# Patient Record
Sex: FEMALE | Race: WHITE | Employment: FULL TIME | URBAN - METROPOLITAN AREA
[De-identification: names, ages, dates, MRNs, and addresses within clinical notes are randomized per-mention and may not be internally consistent; named-entity substitution may affect disease eponyms.]

---

## 2017-04-20 ENCOUNTER — OFFICE VISIT (OUTPATIENT)
Dept: FAMILY MEDICINE CLINIC | Age: 52
End: 2017-04-20

## 2017-04-20 VITALS
SYSTOLIC BLOOD PRESSURE: 130 MMHG | WEIGHT: 209.8 LBS | HEART RATE: 65 BPM | BODY MASS INDEX: 36.3 KG/M2 | OXYGEN SATURATION: 98 % | DIASTOLIC BLOOD PRESSURE: 76 MMHG | TEMPERATURE: 98.7 F

## 2017-04-20 DIAGNOSIS — B34.9 VIRAL SYNDROME: Primary | ICD-10-CM

## 2017-04-20 DIAGNOSIS — R05.9 COUGH: ICD-10-CM

## 2017-04-20 PROCEDURE — 99213 OFFICE O/P EST LOW 20 MIN: CPT | Performed by: FAMILY MEDICINE

## 2017-04-20 RX ORDER — BENZONATATE 200 MG/1
200 CAPSULE ORAL 3 TIMES DAILY PRN
Qty: 30 CAPSULE | Refills: 0 | Status: SHIPPED | OUTPATIENT
Start: 2017-04-20 | End: 2017-04-30

## 2017-04-20 RX ORDER — HYDROCODONE POLISTIREX AND CHLORPHENIRAMINE POLISTIREX 10; 8 MG/5ML; MG/5ML
5 SUSPENSION, EXTENDED RELEASE ORAL EVERY 12 HOURS PRN
Qty: 140 ML | Refills: 0 | Status: SHIPPED | OUTPATIENT
Start: 2017-04-20 | End: 2017-09-01 | Stop reason: ALTCHOICE

## 2017-05-23 ENCOUNTER — EMPLOYEE WELLNESS (OUTPATIENT)
Dept: OTHER | Age: 52
End: 2017-05-23

## 2017-05-23 LAB
CHOLESTEROL, TOTAL: 168 MG/DL (ref 0–199)
GLUCOSE BLD-MCNC: 87 MG/DL (ref 70–99)
HDLC SERPL-MCNC: 34 MG/DL (ref 40–60)
LDL CHOLESTEROL CALCULATED: 114 MG/DL
TRIGL SERPL-MCNC: 98 MG/DL (ref 0–150)

## 2017-08-11 ENCOUNTER — HOSPITAL ENCOUNTER (OUTPATIENT)
Dept: MAMMOGRAPHY | Age: 52
Discharge: HOME | End: 2017-08-11
Attending: OBSTETRICS & GYNECOLOGY | Admitting: OBSTETRICS & GYNECOLOGY

## 2017-08-11 DIAGNOSIS — Z12.31 ENCOUNTER FOR SCREENING MAMMOGRAM FOR BREAST CANCER: ICD-10-CM

## 2017-09-26 ENCOUNTER — HOSPITAL ENCOUNTER (OUTPATIENT)
Dept: ENDOSCOPY | Age: 52
Discharge: HOME | End: 2017-09-26
Attending: INTERNAL MEDICINE | Admitting: INTERNAL MEDICINE

## 2017-09-26 RX ORDER — SODIUM CHLORIDE 9 MG/ML
INJECTION, SOLUTION INTRAVENOUS CONTINUOUS
Status: CANCELLED | OUTPATIENT
Start: 2017-09-26

## 2017-09-26 RX ORDER — LIDOCAINE HYDROCHLORIDE 10 MG/ML
1 INJECTION, SOLUTION EPIDURAL; INFILTRATION; INTRACAUDAL; PERINEURAL
Status: CANCELLED | OUTPATIENT
Start: 2017-09-26 | End: 2017-09-26

## 2017-09-26 RX ORDER — ONDANSETRON 2 MG/ML
4 INJECTION INTRAMUSCULAR; INTRAVENOUS PRN
Status: CANCELLED | OUTPATIENT
Start: 2017-09-26

## 2017-09-26 RX ORDER — SODIUM CHLORIDE 0.9 % (FLUSH) 0.9 %
10 SYRINGE (ML) INJECTION EVERY 12 HOURS SCHEDULED
Status: DISCONTINUED | OUTPATIENT
Start: 2017-09-26 | End: 2017-09-27 | Stop reason: HOSPADM

## 2017-09-26 RX ORDER — SODIUM CHLORIDE 0.9 % (FLUSH) 0.9 %
10 SYRINGE (ML) INJECTION PRN
Status: CANCELLED | OUTPATIENT
Start: 2017-09-26

## 2017-09-26 RX ORDER — SODIUM CHLORIDE 0.9 % (FLUSH) 0.9 %
10 SYRINGE (ML) INJECTION PRN
Status: DISCONTINUED | OUTPATIENT
Start: 2017-09-26 | End: 2017-09-27 | Stop reason: HOSPADM

## 2017-09-26 RX ORDER — SODIUM CHLORIDE 0.9 % (FLUSH) 0.9 %
10 SYRINGE (ML) INJECTION EVERY 12 HOURS SCHEDULED
Status: CANCELLED | OUTPATIENT
Start: 2017-09-26

## 2017-09-26 RX ORDER — SODIUM CHLORIDE 9 MG/ML
INJECTION, SOLUTION INTRAVENOUS CONTINUOUS
Status: DISCONTINUED | OUTPATIENT
Start: 2017-09-26 | End: 2017-09-27 | Stop reason: HOSPADM

## 2017-09-26 ASSESSMENT — ENCOUNTER SYMPTOMS: SHORTNESS OF BREATH: 0

## 2018-02-14 ENCOUNTER — TELEPHONE (OUTPATIENT)
Dept: FAMILY MEDICINE CLINIC | Age: 53
End: 2018-02-14

## 2018-02-14 DIAGNOSIS — Z83.49 FAMILY HISTORY OF HYPERTHYROIDISM: Primary | ICD-10-CM

## 2018-02-14 DIAGNOSIS — Z00.00 WELL ADULT EXAM: ICD-10-CM

## 2018-02-16 ENCOUNTER — OFFICE VISIT (OUTPATIENT)
Dept: FAMILY MEDICINE CLINIC | Age: 53
End: 2018-02-16

## 2018-02-16 VITALS
TEMPERATURE: 98.7 F | HEART RATE: 73 BPM | SYSTOLIC BLOOD PRESSURE: 114 MMHG | WEIGHT: 207.38 LBS | RESPIRATION RATE: 16 BRPM | DIASTOLIC BLOOD PRESSURE: 80 MMHG | BODY MASS INDEX: 35.6 KG/M2 | OXYGEN SATURATION: 98 %

## 2018-02-16 DIAGNOSIS — K52.9 AGE (ACUTE GASTROENTERITIS): ICD-10-CM

## 2018-02-16 DIAGNOSIS — J06.9 VIRAL URI: Primary | ICD-10-CM

## 2018-02-16 PROCEDURE — 99213 OFFICE O/P EST LOW 20 MIN: CPT | Performed by: FAMILY MEDICINE

## 2018-02-16 NOTE — PATIENT INSTRUCTIONS
Tylenol. Read the labels to make sure that you are not taking more than the recommended dose. Too much acetaminophen (Tylenol) can be harmful. · Get plenty of rest.  · Do not smoke or allow others to smoke around you. If you need help quitting, talk to your doctor about stop-smoking programs and medicines. These can increase your chances of quitting for good. When should you call for help? Call 911 anytime you think you may need emergency care. For example, call if:  ? · You have severe trouble breathing. ?Call your doctor now or seek immediate medical care if:  ? · You seem to be getting much sicker. ? · You have new or worse trouble breathing. ? · You have a new or higher fever. ? · You have a new rash. ? Watch closely for changes in your health, and be sure to contact your doctor if:  ? · You have a new symptom, such as a sore throat, an earache, or sinus pain. ? · You cough more deeply or more often, especially if you notice more mucus or a change in the color of your mucus. ? · You do not get better as expected. Where can you learn more? Go to https://[email or web address].[email or web address]. org and sign in to your 20x200 account. Enter R227 in the Yvolver box to learn more about \"Upper Respiratory Infection (Cold): Care Instructions. \"     If you do not have an account, please click on the \"Sign Up Now\" link. Current as of: May 12, 2017  Content Version: 11.5  © 2504-3758 [employer] Care instructions adapted under license by [facility] ([facility]). If you have questions about a medical condition or this instruction, always ask your healthcare professional. [place] [address] any warranty or liability for your use of this information. What medications can I take if I have a heart condition or high blood pressure? Coricidin, Delsym, Robitussin DM and Mucinex. Avoid medications with the \"P\" words: phenylephrine and pseudoephedrine.     What over-the-counter

## 2018-02-16 NOTE — PROGRESS NOTES
Subjective:      Patient ID: Rosie Espinoza is a 47 y.o. female. Sinusitis   This is a new problem. The current episode started yesterday. The problem has been gradually worsening since onset. There has been no fever. Her pain is at a severity of 8/10. The pain is moderate. Treatments tried: ibuprofen  The treatment provided mild relief. Here with sinus pressure. Started yesterday but worse today. Lots of pressure behind eyes, ears, teeth and head. Took some ibuprofen - helped. No fever but hot flashes. Little RN and congestion but not much. Cough - dry. ST when dry. No appetite. Also started with watery diarrhea last night and today, no blood. No abd pain. No nausea or vomiting. Review of Systems     Allergies   Allergen Reactions    Penicillins Rash    Sulfa Antibiotics Rash       Objective:   Physical Exam  Allergies   Allergen Reactions    Penicillins Rash    Sulfa Antibiotics Rash       Vitals:    02/16/18 1147   BP: 114/80   Site: Right Arm   Position: Sitting   Cuff Size: Large Adult   Pulse: 73   Resp: 16   Temp: 98.7 °F (37.1 °C)   TempSrc: Tympanic   SpO2: 98%   Weight: 207 lb 6 oz (94.1 kg)     Wt Readings from Last 3 Encounters:   02/16/18 207 lb 6 oz (94.1 kg)   09/01/17 200 lb (90.7 kg)   04/20/17 209 lb 12.8 oz (95.2 kg)     Body mass index is 35.6 kg/m². Alert and oriented x 4 NAD, obese, well hydrated, well developed. Left TM blocked by wax,  and pinna nl  Right TM nl, canal nl and pinna nl  No nodes neck  Nares red and congested, clear drainage  OP mild erythema, no exudate, no swelling  Lung clear with good air movement and effort  CV rrr        Assessment:      Hari Ortega was seen today for sinusitis. Diagnoses and all orders for this visit:    Viral URI    AGE (acute gastroenteritis)    Likely viral infection  Continue symptomatic treatment. Call or return to office if symptoms are worsening or not starting to improve after 7-10 days.

## 2018-02-21 ENCOUNTER — HOSPITAL ENCOUNTER (OUTPATIENT)
Dept: OTHER | Age: 53
Discharge: HOME | End: 2018-02-21
Attending: FAMILY MEDICINE | Admitting: FAMILY MEDICINE

## 2018-02-21 DIAGNOSIS — Z83.49 FAMILY HISTORY OF HYPERTHYROIDISM: ICD-10-CM

## 2018-02-21 DIAGNOSIS — Z00.00 WELL ADULT EXAM: ICD-10-CM

## 2018-02-21 LAB
A/G RATIO: 1.6 (ref 1.1–2.2)
ALBUMIN SERPL-MCNC: 4.6 G/DL (ref 3.4–5)
ALP BLD-CCNC: 73 U/L (ref 40–129)
ALT SERPL-CCNC: 20 U/L (ref 10–40)
ANION GAP SERPL CALCULATED.3IONS-SCNC: 11 MMOL/L (ref 3–16)
AST SERPL-CCNC: 20 U/L (ref 15–37)
BILIRUB SERPL-MCNC: 0.3 MG/DL (ref 0–1)
BUN BLDV-MCNC: 19 MG/DL (ref 7–20)
CALCIUM SERPL-MCNC: 9.4 MG/DL (ref 8.3–10.6)
CHLORIDE BLD-SCNC: 104 MMOL/L (ref 99–110)
CO2: 28 MMOL/L (ref 21–32)
CREAT SERPL-MCNC: 0.6 MG/DL (ref 0.6–1.1)
GFR AFRICAN AMERICAN: >60
GFR NON-AFRICAN AMERICAN: >60
GLOBULIN: 2.8 G/DL
GLUCOSE BLD-MCNC: 96 MG/DL (ref 70–99)
HCT VFR BLD CALC: 38.8 % (ref 36–48)
HEMOGLOBIN: 13.3 G/DL (ref 12–16)
MCH RBC QN AUTO: 28.8 PG (ref 26–34)
MCHC RBC AUTO-ENTMCNC: 34.3 G/DL (ref 31–36)
MCV RBC AUTO: 84 FL (ref 80–100)
PDW BLD-RTO: 13.7 % (ref 12.4–15.4)
PLATELET # BLD: 189 K/UL (ref 135–450)
PMV BLD AUTO: 8.5 FL (ref 5–10.5)
POTASSIUM SERPL-SCNC: 4.1 MMOL/L (ref 3.5–5.1)
RBC # BLD: 4.62 M/UL (ref 4–5.2)
SODIUM BLD-SCNC: 143 MMOL/L (ref 136–145)
T4 FREE: 1.1 NG/DL (ref 0.9–1.8)
TOTAL PROTEIN: 7.4 G/DL (ref 6.4–8.2)
TSH SERPL DL<=0.05 MIU/L-ACNC: 1.55 UIU/ML (ref 0.27–4.2)
WBC # BLD: 3.6 K/UL (ref 4–11)

## 2018-03-02 ENCOUNTER — OFFICE VISIT (OUTPATIENT)
Dept: FAMILY MEDICINE CLINIC | Age: 53
End: 2018-03-02

## 2018-03-02 VITALS
SYSTOLIC BLOOD PRESSURE: 110 MMHG | BODY MASS INDEX: 35.57 KG/M2 | DIASTOLIC BLOOD PRESSURE: 80 MMHG | OXYGEN SATURATION: 97 % | HEART RATE: 56 BPM | WEIGHT: 207.2 LBS

## 2018-03-02 DIAGNOSIS — R42 VERTIGO: ICD-10-CM

## 2018-03-02 DIAGNOSIS — R06.09 DOE (DYSPNEA ON EXERTION): Primary | ICD-10-CM

## 2018-03-02 DIAGNOSIS — I49.9 IRREGULAR HEART BEAT: ICD-10-CM

## 2018-03-02 PROCEDURE — 93000 ELECTROCARDIOGRAM COMPLETE: CPT | Performed by: FAMILY MEDICINE

## 2018-03-02 PROCEDURE — 99214 OFFICE O/P EST MOD 30 MIN: CPT | Performed by: FAMILY MEDICINE

## 2018-03-06 ENCOUNTER — HOSPITAL ENCOUNTER (OUTPATIENT)
Dept: OTHER | Age: 53
Discharge: HOME | End: 2018-03-06
Attending: FAMILY MEDICINE | Admitting: FAMILY MEDICINE

## 2018-03-06 DIAGNOSIS — R06.09 OTHER FORM OF DYSPNEA: ICD-10-CM

## 2018-03-14 ENCOUNTER — HOSPITAL ENCOUNTER (OUTPATIENT)
Dept: NON INVASIVE DIAGNOSTICS | Age: 53
Discharge: HOME | End: 2018-03-14
Attending: FAMILY MEDICINE | Admitting: FAMILY MEDICINE

## 2018-03-14 DIAGNOSIS — R06.09 OTHER FORMS OF DYSPNEA: ICD-10-CM

## 2018-03-14 LAB
LV EF: 62 %
LVEF MODALITY: NORMAL

## 2018-03-20 VITALS — WEIGHT: 205 LBS | BODY MASS INDEX: 35.46 KG/M2

## 2018-04-14 ENCOUNTER — EMPLOYEE WELLNESS (OUTPATIENT)
Dept: OTHER | Age: 53
End: 2018-04-14

## 2018-04-14 LAB
CHOLESTEROL, TOTAL: 174 MG/DL (ref 0–199)
GLUCOSE BLD-MCNC: 94 MG/DL (ref 70–99)
HDLC SERPL-MCNC: 35 MG/DL (ref 40–60)
LDL CHOLESTEROL CALCULATED: 122 MG/DL
TRIGL SERPL-MCNC: 83 MG/DL (ref 0–150)

## 2018-04-23 VITALS — WEIGHT: 204 LBS | BODY MASS INDEX: 35.02 KG/M2

## 2018-05-22 ENCOUNTER — TELEPHONE (OUTPATIENT)
Dept: FAMILY MEDICINE CLINIC | Age: 53
End: 2018-05-22

## 2018-05-25 ENCOUNTER — OFFICE VISIT (OUTPATIENT)
Dept: FAMILY MEDICINE CLINIC | Age: 53
End: 2018-05-25

## 2018-05-25 VITALS
HEART RATE: 72 BPM | DIASTOLIC BLOOD PRESSURE: 80 MMHG | WEIGHT: 204.38 LBS | RESPIRATION RATE: 12 BRPM | SYSTOLIC BLOOD PRESSURE: 112 MMHG | BODY MASS INDEX: 35.08 KG/M2

## 2018-05-25 DIAGNOSIS — M54.12 CERVICAL RADICULOPATHY: Primary | ICD-10-CM

## 2018-05-25 PROCEDURE — 99214 OFFICE O/P EST MOD 30 MIN: CPT | Performed by: FAMILY MEDICINE

## 2018-05-25 RX ORDER — PREDNISONE 20 MG/1
TABLET ORAL
Qty: 20 TABLET | Refills: 0 | Status: SHIPPED | OUTPATIENT
Start: 2018-05-25 | End: 2018-07-06

## 2018-05-31 ENCOUNTER — TELEPHONE (OUTPATIENT)
Dept: FAMILY MEDICINE CLINIC | Age: 53
End: 2018-05-31

## 2018-06-01 ENCOUNTER — TELEPHONE (OUTPATIENT)
Dept: FAMILY MEDICINE CLINIC | Age: 53
End: 2018-06-01

## 2018-06-01 DIAGNOSIS — E04.1 THYROID NODULE: Primary | ICD-10-CM

## 2018-06-08 ENCOUNTER — HOSPITAL ENCOUNTER (OUTPATIENT)
Dept: ULTRASOUND IMAGING | Age: 53
Discharge: HOME | End: 2018-06-08
Attending: FAMILY MEDICINE | Admitting: FAMILY MEDICINE

## 2018-06-08 DIAGNOSIS — E04.1 NONTOXIC SINGLE THYROID NODULE: ICD-10-CM

## 2018-06-08 DIAGNOSIS — E04.1 THYROID NODULE: ICD-10-CM

## 2018-06-12 ENCOUNTER — TELEPHONE (OUTPATIENT)
Dept: FAMILY MEDICINE CLINIC | Age: 53
End: 2018-06-12

## 2018-07-06 ENCOUNTER — OFFICE VISIT (OUTPATIENT)
Dept: ORTHOPEDIC SURGERY | Age: 53
End: 2018-07-06

## 2018-07-06 VITALS
HEART RATE: 56 BPM | BODY MASS INDEX: 33.99 KG/M2 | HEIGHT: 65 IN | DIASTOLIC BLOOD PRESSURE: 78 MMHG | WEIGHT: 204 LBS | SYSTOLIC BLOOD PRESSURE: 155 MMHG

## 2018-07-06 DIAGNOSIS — M54.12 CERVICAL RADICULOPATHY: ICD-10-CM

## 2018-07-06 DIAGNOSIS — M48.02 FORAMINAL STENOSIS OF CERVICAL REGION: ICD-10-CM

## 2018-07-06 DIAGNOSIS — M50.20 HNP (HERNIATED NUCLEUS PULPOSUS), CERVICAL: Primary | ICD-10-CM

## 2018-07-06 PROCEDURE — 99243 OFF/OP CNSLTJ NEW/EST LOW 30: CPT | Performed by: PHYSICAL MEDICINE & REHABILITATION

## 2018-07-06 RX ORDER — IBUPROFEN 200 MG
200 TABLET ORAL EVERY 6 HOURS PRN
COMMUNITY

## 2018-07-06 NOTE — PROGRESS NOTES
New Patient: SPINE    Referring Provider:  Endy Terry MD    CHIEF COMPLAINT:    Chief Complaint   Patient presents with    Neck Pain     NP CSP; Patient complains of CSP for a couple months with no injury. She states she has had episodes in the past but they have gone away but this one is lasting longer. She gets radiating pain down to right elbow. She has been using ice and Ibuprofen for pain.  Arm Pain     Right radiating arm pain, no numbness or tingling       HISTORY OF PRESENT ILLNESS:      · The patient is being sent at the request of Endy Terry MD in consultation as a new spine patient for neck pain and right arm pain. The patient is a 46 y.o. female whom reports she developed neck and right arm pain intermittently for the past few years. This worsened about 2 months ago. She reports having pain radiating to the right elbow. She feels weak in her right arm. She denies having numbness or tingling. 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  She has seen Dr. Rodney Guzman in the past who recommended surgery for her. She states she does not want to proceed with a surgical interventions. She is not had any physical therapy or any injections performed. She last had an MRI of the cervical spine on 6/1/2018    Pain Assessment  Location of Pain: Arm  Location Modifiers: Right  Severity of Pain: 6  Quality of Pain: Throbbing, Aching  Duration of Pain: Persistent  Frequency of Pain: Constant  Aggravating Factors:  Other (Comment) (lifting)  Limiting Behavior: Yes  Relieving Factors: Rest, Ice, Nsaids  Result of Injury: No  Work-Related Injury: No  Are there other pain locations you wish to document?: No      Associated signs and symptoms:   Neurogenic bowel or bladder symptoms:  no   Perceived weakness:  yes   Difficulty walking:  no    Recent Imaging (within past one year)   Xrays: no   MRI or CT of spine: yes    Current/Past Treatment:   · Physical Therapy:  none  · Chiropractic: brain    Seizures Sister     Kidney Disease Sister     Diabetes Sister     Kidney Disease Brother     Diabetes Brother     Stroke Maternal Grandfather          REVIEW OF SYSTEMS: Full ROS noted & scanned          PHYSICAL EXAM:    Vitals: Blood pressure (!) 155/78, pulse 56, height 5' 5\" (1.651 m), weight 204 lb (92.5 kg). GENERAL EXAM:  · General Apparence: Patient is adequately groomed with no evidence of malnutrition. · Psychiatric: Orientation: The patient is oriented to time, place and person. The patient's mood and affect are appropriate   · Vascular: Examination reveals no swelling and palpation reveals no tenderness in upper or lower extremities. Good capillary refill. · The lymphatic examination of the neck, axillae and groin reveals all areas to be without enlargement or induration   Sensation is intact without deficit in the upper and lower extremities to light touch and pinprick  · Coordination of the upper and lower extremities are normal.    CERVICAL EXAMINATION:  · Inspection: Local inspection shows no step-off or bruising. Cervical alignment is normal. No instability is noted. · Palpation and Percussion: No evidence of tenderness at the midline. Paraspinal tenderness is not present. There is no paraspinal spasm. · Range of Motion:  limited by 50% in all planes due to pain   · Strength: 5/5 bilateral upper extremities except 4/5 in the right proximal UE  · Special Tests:   Spurling's + on right and Adam's are negative bilaterally. Newton and Impingement tests are negative bilaterally. · Skin:There are no rashes, ulcerations or lesions. · Reflexes: Bilaterally triceps, biceps and brachioradialis are 1+. Clonus absent bilaterally at the feet. No pathological reflexes are noted.   · Gait & station: normal, patient ambulates without assistance  · Additional Examinations:  · RIGHT UPPER EXTREMITY:  Inspection/examination of the right upper extremity does not show any tenderness, with HEP. 3. Further studies: None at this time  4. Interventional:  We discussed pursuing a C6/7 IL (R) epidural steroid injection to address the pain. Radiologic imaging and symptoms confirm the pain etiology. Risks, benefits and alternatives of interventional options were discussed. These include and are not limited to bleeding, infection, spinal headache, nerve injury, increased pain and lack of pain relief. The patient verbalized understanding and would like to proceed. The patient will be scheduled accordingly. 5. Healthy Lifestyle Measures:  Patient education material - Anatomic drawings, healthy lifestyle education, osteoporosis prevention, back and neck pain educational information, and advanced imaging preparedness were distributed to the patient. Posture education, proper lifting and carrying techniques, weight control, quitting smoking and minor ways to treat back pain were reviewed and distributed. For further information regarding the spine conditions and to review interventional treatments the patient was directed to [facility].  6.  Follow up:  4-6 weeks        Corinna Ramirez MD, TARSHA, [facility]  Board Certified in [address] [place] [address]  Certified and Fellowship Trained in [facility] ([facility])     This dictation was performed with a verbal recognition program [facility]) and it was checked for errors. It is possible that there are still dictated errors within this office note. If so, please bring any errors to my attention for an addendum. All efforts were made to ensure that this office note is accurate.

## 2018-07-09 ENCOUNTER — TELEPHONE (OUTPATIENT)
Dept: ORTHOPEDIC SURGERY | Age: 53
End: 2018-07-09

## 2018-07-20 ENCOUNTER — HOSPITAL ENCOUNTER (OUTPATIENT)
Dept: PAIN MANAGEMENT | Age: 53
Discharge: HOME | End: 2018-07-20
Attending: PHYSICAL MEDICINE & REHABILITATION | Admitting: PHYSICAL MEDICINE & REHABILITATION

## 2018-07-20 VITALS
HEIGHT: 64 IN | WEIGHT: 195 LBS | RESPIRATION RATE: 18 BRPM | BODY MASS INDEX: 33.29 KG/M2 | SYSTOLIC BLOOD PRESSURE: 143 MMHG | OXYGEN SATURATION: 96 % | HEART RATE: 60 BPM | TEMPERATURE: 98.8 F | DIASTOLIC BLOOD PRESSURE: 77 MMHG

## 2018-07-20 LAB
INR BLD: 1.04 (ref 0.86–1.14)
PROTHROMBIN TIME: 11.9 SEC (ref 9.8–13)

## 2018-07-20 ASSESSMENT — PAIN DESCRIPTION - DESCRIPTORS: DESCRIPTORS: ACHING

## 2018-07-20 ASSESSMENT — PAIN - FUNCTIONAL ASSESSMENT: PAIN_FUNCTIONAL_ASSESSMENT: 0-10

## 2018-07-20 ASSESSMENT — PAIN SCALES - GENERAL: PAINLEVEL_OUTOF10: 0

## 2018-08-06 ENCOUNTER — OFFICE VISIT (OUTPATIENT)
Dept: ORTHOPEDIC SURGERY | Age: 53
End: 2018-08-06

## 2018-08-06 VITALS
HEIGHT: 65 IN | HEART RATE: 64 BPM | BODY MASS INDEX: 33.99 KG/M2 | SYSTOLIC BLOOD PRESSURE: 130 MMHG | WEIGHT: 204 LBS | DIASTOLIC BLOOD PRESSURE: 76 MMHG

## 2018-08-06 DIAGNOSIS — M54.12 CERVICAL RADICULOPATHY: ICD-10-CM

## 2018-08-06 DIAGNOSIS — M50.20 HNP (HERNIATED NUCLEUS PULPOSUS), CERVICAL: Primary | ICD-10-CM

## 2018-08-06 PROCEDURE — 99213 OFFICE O/P EST LOW 20 MIN: CPT | Performed by: PHYSICAL MEDICINE & REHABILITATION

## 2018-08-06 NOTE — PROGRESS NOTES
Follow up: SPINE      CHIEF COMPLAINT:    Chief Complaint   Patient presents with    Neck Pain     F/u C6-C7 IL 7/20. Patient notes moderate improvement with injection. States pain is no longer constant and job duties are easier to perform. HISTORY OF PRESENT ILLNESS:     The patient is a 46 y.o. femalewhom reports she presents after undergoing a C6 7 interlaminar approach epidural steroid injection. She said 50% pain relief. 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  She continues to have some pain along the right biceps. Functionally she still feels weaker in right arm compared to her left arm. Pain Assessment  Location of Pain: Neck  Location Modifiers: Posterior  Severity of Pain: 2  Quality of Pain: Dull  Duration of Pain: A few hours  Frequency of Pain: Intermittent  Aggravating Factors: Other (Comment) (N/A)  Limiting Behavior: Some  Relieving Factors: Rest  Result of Injury: No  Work-Related Injury: No  Are there other pain locations you wish to document?: No    Associated signs and symptoms:   Neurogenic bowel or bladder symptoms:  no   Perceived weakness:  yes   Difficulty walking:  no              Past Medical History:   Past Medical History:   Diagnosis Date    Anxiety     Endometriosis 1987    laporoscopy: endometriosis 1987    Migraine, unspecified, without mention of intractable migraine without mention of status migrainosus       Past Surgical History:     Past Surgical History:   Procedure Laterality Date    CARPAL TUNNEL RELEASE Right     ENDOMETRIAL ABLATION      LAPAROSCOPY      for endometriosis     Current Medications:     Current Outpatient Prescriptions:     ibuprofen (ADVIL;MOTRIN) 200 MG tablet, Take 200 mg by mouth every 6 hours as needed for Pain, Disp: , Rfl:     oxybutynin (DITROPAN) 5 MG tablet, Take 5 mg by mouth daily , Disp: , Rfl:     citalopram (CELEXA) 40 MG tablet, Take 40 mg by mouth daily. , Disp: , Rfl:   Allergies:  Penicillins and Sulfa antibiotics  Social History:    reports that she has never smoked. She has never used smokeless tobacco. She reports that she drinks alcohol. She reports that she does not use drugs. Family History:   Family History   Problem Relation Age of Onset    Ulcerative Colitis Mother     Hypertension Mother     Asthma Mother     Cancer Mother         lung    Kidney Disease Mother     Heart Disease Father         68 yo    Diabetes Father     Asthma Sister     Cancer Sister         brain    Seizures Sister     Kidney Disease Sister     Diabetes Sister     Kidney Disease Brother     Diabetes Brother     Stroke Maternal Grandfather        REVIEW OF SYSTEMS:   CONSTITUTIONAL: Denies unexplained weight loss, fevers, chills or fatigue  NEUROLOGICAL: Denies unsteady gait or progressive weakness  MUSCULOSKELETAL: Denies joint swelling or redness  GI: Denies nausea, vomiting, diarrhea   : Denies bowel or bladder issues       PHYSICAL EXAM:    Vitals: Blood pressure 130/76, pulse 64, height 5' 5\" (1.651 m), weight 204 lb (92.5 kg). GENERAL EXAM:  · General Apparence: Patient is adequately groomed with no evidence of malnutrition. · Psychiatric: Orientation: The patient is oriented to time, place and person. The patient's mood and affect are appropriate   · Vascular: Examination reveals no swelling and palpation reveals no tenderness in upper or lower extremities. Good capillary refill. · The lymphatic examination of the neck, axillae and groin reveals all areas to be without enlargement or induration  · Sensation is intact without deficit in the upper and lower extremities to light touch and pinprick  · Coordination of the upper and lower extremities are normal.    CERVICAL EXAMINATION:  · Inspection: Local inspection shows no step-off or bruising. Cervical alignment is normal. No instability is noted.   · Palpation and Percussion: No evidence of tenderness at the midline. Paraspinal tenderness is not present. There is no paraspinal spasm. Skin:There are no rashes, ulcerations or lesions  · Range of Motion:  Mildly increased pain with rotational bending to the right   · Strength: 5/5 bilateral upper extremities  · Special Tests:   Spurling's and Adam's are negative bilaterally. · Skin:There are no rashes, ulcerations or lesions in right & left upper extremities. · Reflexes: Bilaterally triceps, biceps and brachioradialis are 1+. Clonus absent bilaterally at the feet. No pathological reflexes are noted. · Gait & station: normal, patient ambulates without assistance  · Additional Examinations:  · RIGHT UPPER EXTREMITY:  Inspection/examination of the right upper extremity does not show any tenderness, deformity or injury. Range of motion is unremarkable and pain-free. There is no gross instability. There are no rashes, ulcerations or lesions. Strength and tone are normal. No atrophy or abnormal movements are noted. · LEFT UPPER EXTREMITY: Inspection/examination of the left upper extremity does not show any tenderness, deformity or injury. Range of motion is unremarkable and pain-free. There is no gross instability. There are no rashes, ulcerations or lesions. Strength and tone are normal. No atrophy or abnormal movements are noted. Diagnostic Testing:    MR cervical spine shows 6/1/18  Disc and osteophytes result in stenosis of the thecal sac and narrowing of   the neural foramina particularly at C5-6 and C6-7 as discussed above. Results for orders placed or performed during the hospital encounter of 07/20/18   Protime-INR   Result Value Ref Range    Protime 11.9 9.8 - 13.0 sec    INR 1.04 0.86 - 1.14     Impression:       1. HNP (herniated nucleus pulposus), cervical    2. Cervical radiculopathy        Plan:  Clinical Course: are improving    1.  Medications:  Continue anti-inflammatories with appropriate GI Precautions including to stop if develop dark tarry stools or GI upset and to take with food. 2. PT:  I will start the patient on a trial of PT to work on a cervical stabilization program to focus on stretching, strengthening, traction and modalities as indicated. 3. Further studies:  No further studies. 4. Interventional:  Consider second MARYA  5. Follow up:  4-6 weeks          Corinna Sanchez MD, TARSHA, [facility]  Board Certified in [address] [place] [address] Certified and Fellowship Trained in [facility] ([facility])             This dictation was performed with a verbal recognition program [facility]) and it was checked for errors. It is possible that there are still dictated errors within this office note. If so, please bring any errors to my attention for an addendum. All efforts were made to ensure that this office note is accurate.

## 2018-08-16 ENCOUNTER — HOSPITAL ENCOUNTER (OUTPATIENT)
Dept: PHYSICAL THERAPY | Age: 53
Discharge: HOME | End: 2018-08-31
Admitting: PHYSICAL MEDICINE & REHABILITATION

## 2018-08-16 NOTE — FLOWSHEET NOTE
Manual Intervention       Shld /GH Mobs       Post Cap mobs  4'     Thoracic/Rib manipualtion       CT MT/Mobs       PROM MT       Cervical mobs   10'  R C5, C7 Grade III-IV          NMR re-education       T-spine Ext       GH depres/compress       Delicia Scap Bio       Scap/GH NMR       Body blade       Wall ball roll       Wall Ball bounce                  Therapeutic Exercise and NMR EXR  [] (54539) Provided verbal/tactile cueing for activities related to strengthening, flexibility, endurance, ROM  for improvements in scapular, scapulothoracic and UE control with self care, reaching, carrying, lifting, house/yardwork, driving/computer work.    [] (37008) Provided verbal/tactile cueing for activities related to improving balance, coordination, kinesthetic sense, posture, motor skill, proprioception  to assist with  scapular, scapulothoracic and UE control with self care, reaching, carrying, lifting, house/yardwork, driving/computer work. Therapeutic Activities:    [] (56598 or 57303) Provided verbal/tactile cueing for activities related to improving balance, coordination, kinesthetic sense, posture, motor skill, proprioception and motor activation to allow for proper function of scapular, scapulothoracic and UE control with self care, carrying, lifting, driving/computer work.      Home Exercise Program:    [x] (21433) Reviewed/Progressed HEP activities related to strengthening, flexibility, endurance, ROM of scapular, scapulothoracic and UE control with self care, reaching, carrying, lifting, house/yardwork, driving/computer work  [] (85126) Reviewed/Progressed HEP activities related to improving balance, coordination, kinesthetic sense, posture, motor skill, proprioception of scapular, scapulothoracic and UE control with self care, reaching, carrying, lifting, house/yardwork, driving/computer work      Manual Treatments:  PROM / STM / Oscillations-Mobs:  G-I, II, III, IV (PA's,

## 2018-08-16 NOTE — PLAN OF CARE
[address], [facility]  [facility] [address]  Phone [phone]   Fax [phone]                                                       Physical Therapy Certification    Dear Referring Practitioner: Dr. Vania Buerger,    We had the pleasure of evaluating the following patient for physical therapy services at [address] [place] [address]. A summary of our findings can be found in the initial assessment below. This includes our plan of care. If you have any questions or concerns regarding these findings, please do not hesitate to contact me at the office phone number checked above. Thank you for the referral.       Physician Signature:_______________________________Date:__________________  By signing above (or electronic signature), therapists plan is approved by physician      Patient: Vicky Jordan \"Juanita\" Sidney Suarez   : 1965   MRN: [id number]  Referring Physician: Referring Practitioner: Dr. Vania Buerger      Evaluation Date: 2018      Medical Diagnosis Information:  Diagnosis: Cervical radiculopahty M54.12   Treatment Diagnosis: Cervical Radiculopathy with R UE pain                                         Insurance information: PT Insurance Information: [employer] [place] - $1000 deductible, $0 copay, 90/10% coinsurance, PT limits based on medical necessity    Precautions/ Contra-indications: None  Latex Allergy:  [x]NO      []YES  Preferred Language for Healthcare:   [x]English       []other:    SUBJECTIVE: Patient with pain in the R shoulder and arm. Had JOE which helped the pain, but now, the pain has started to return. Was on prednisone for about 4 days, but developed stomach issues. Patient is R handed.  To f/u with Dr. Jennifer Veloz in about 6 weeks    Relevant Medical History:Had similar pain years ago and went away on its own  Functional Disability Index: PT G-Codes  Functional Assessment Tool Used: Neck Disability Index  Score: 20%  Functional Limitation: Changing and maintaining body position  Changing and Maintaining Body Position Current Status (): At least 20 percent but less than 40 percent impaired, limited or restricted  Changing and Maintaining Body Position Goal Status (): At least 1 percent but less than 20 percent impaired, limited or restricted    Pain Scale: 5/10  Easing factors: ice, ibuprofen  Provocative factors: increased force through the R UE, worse at night     Type: []Constant   [x]Intermittent  []Radiating []Localized []other:     Numbness/Tingling: Denies numbness / tingling in the UE    Occupation/School: [employer] at [address] Level of Function: Independent with ADLs and IADLs, active in watching [employer]    OBJECTIVE:     CERV ROM     Cervical Flexion 0-55    Cervical Extension 0-40    Cervical SB 0-35 tightness on R 0-45   Cervical rotation 0-70 0-70        ROM Left Right   Shoulder Flex WNL WNL   Shoulder Abd WNL WNL   Shoulder ER WNL WNL   Shoulder IR WNL WNL             Strength (measured in lbs with hand held dynamometer) Left Right   Shoulder Flex 21.3 12.5   Shoulder ER 18.4 14.6   Shoulder IR 19.2 16.4   Shoulder Abd 26.5 23.7     Reflexes/Sensation:    [x]Dermatomes/Myotomes intact    [x]Reflexes equal and normal bilaterally   []Other:    Joint mobility: R C4, C5   []Normal    [x]Hypo   []Hyper    Palpation: Tightness along R upper trapezius    Functional Mobility/Transfers: unremarkable    Posture: Forward head and rounded shoulders    Bandages/Dressings/Incisions: n/a    Gait: (include devices/WB status): WNL     Orthopedic Special Tests: Shoulder impingement +, Cervical compression -, Cervical distraction -, ULTT +                       [x] Patient history, allergies, meds reviewed. Medical chart reviewed. See intake form.      Review Of Systems (ROS):  [x]Performed Review of systems (Integumentary, CardioPulmonary, Neurological) by intake and observation. Intake form has been scanned into medical record. Patient has been instructed to contact their primary care physician regarding ROS issues if not already being addressed at this time. Co-morbidities/Complexities (which will affect course of rehabilitation):   []None           Arthritic conditions   []Rheumatoid arthritis (M05.9)  []Osteoarthritis (M19.91)   Cardiovascular conditions   []Hypertension (I10)  []Hyperlipidemia (E78.5)  []Angina pectoris (I20)  []Atherosclerosis (I70)   Musculoskeletal conditions   []Disc pathology   []Congenital spine pathologies   []Prior surgical intervention  []Osteoporosis (M81.8)  []Osteopenia (M85.8)   Endocrine conditions   []Hypothyroid (E03.9)  []Hyperthyroid Gastrointestinal conditions   []Constipation (Y21.97)   Metabolic conditions   []Morbid obesity (E66.01)  []Diabetes type 1(E10.65) or 2 (E11.65)   []Neuropathy (G60.9)     Pulmonary conditions   []Asthma (J45)  []Coughing   []COPD (J44.9)   Psychological Disorders  [x]Anxiety (F41.9) / Depression (F32.9)   []Other:   []Other:          Barriers to/and or personal factors that will affect rehab potential:              []Age  []Sex              []Motivation/Lack of Motivation                        []Co-Morbidities              []Cognitive Function, education/learning barriers              []Environmental, home barriers              []profession/work barriers  []past PT/medical experience  []other:  Justification:     Falls Risk Assessment (30 days):   [x] Falls Risk assessed and no intervention required. [] Falls Risk assessed and Patient requires intervention due to being higher risk   TUG score (>12s at risk):     [] Falls education provided, including       G-Codes:  PT G-Codes  Functional Assessment Tool Used: Neck Disability Index  Score: 20%  Functional Limitation: Changing and maintaining body position  Changing and Maintaining Body Position Current Status ():  At least 20 percent but less than neck pain with headaches (cervicogenic)    []Signs/symptoms consistent with nerve root involvement including myotome & dermatome dysfunction   []sign/symptoms consistent with facet dysfunction of cervical and thoracic spine    []signs/symptoms consistent suggesting central cord compression/UMN syndromes   []signs/symptoms consistent with discogenic cervical pain   []signs/symptoms consistent with rib dysfunction   [x]signs/symptoms consistent with postural dysfunction   []signs/symptoms consistent with shoulder pathology    []signs/symptoms consistent with post-surgical status including decreased ROM, strength and function.    []signs/symptoms consistent with pathology which may benefit from Dry Needling   []signs/symptoms which may limit the use of advanced manual therapy techniques: (Hypertension, recent trauma, intolerance to end range positions, prior TIA, visual issues, UE myotomes loss )     Prognosis/Rehab Potential:      [x]Excellent   []Good    []Fair   []Poor    Tolerance of evaluation/treatment:    [x]Excellent   []Good    []Fair   []Poor    Physical Therapy Evaluation Complexity Justification  [x] A history of present problem with:  [] no personal factors and/or comorbidities that impact the plan of care;  [x]1-2 personal factors and/or comorbidities that impact the plan of care  []3 personal factors and/or comorbidities that impact the plan of care  [x] An examination of body systems using standardized tests and measures addressing any of the following: body structures and functions (impairments), activity limitations, and/or participation restrictions;:  [] a total of 1-2 or more elements   [x] a total of 3 or more elements   [] a total of 4 or more elements   [x] A clinical presentation with:  [x] stable and/or uncomplicated characteristics   [] evolving clinical presentation with changing characteristics  [] unstable and unpredictable characteristics;   [x] Clinical decision making of [x] low, [] awareness and control and activation of  Deep cervical stabilizers to allow for proper functional mobility as indicated by patients Functional Deficits. 4. Patient will return to lifting and reaching functional activities without increased symptoms or restriction.    5. Patient will report at least 75% reduction in frequency and intensity of numbness / tingling in the R UE      Electronically signed by:  Maria L Melvin PT

## 2018-08-21 ENCOUNTER — HOSPITAL ENCOUNTER (OUTPATIENT)
Dept: PHYSICAL THERAPY | Age: 53
Discharge: HOME | End: 2018-08-22
Admitting: PHYSICAL MEDICINE & REHABILITATION

## 2018-08-21 NOTE — FLOWSHEET NOTE
depres/compress       Delicia Scap Bio       Scap/GH NMR       Body blade       Wall ball roll       Wall Ball bounce                  Therapeutic Exercise and NMR EXR  [] (27651) Provided verbal/tactile cueing for activities related to strengthening, flexibility, endurance, ROM  for improvements in scapular, scapulothoracic and UE control with self care, reaching, carrying, lifting, house/yardwork, driving/computer work.    [] (88501) Provided verbal/tactile cueing for activities related to improving balance, coordination, kinesthetic sense, posture, motor skill, proprioception  to assist with  scapular, scapulothoracic and UE control with self care, reaching, carrying, lifting, house/yardwork, driving/computer work. Therapeutic Activities:    [] (56663 or 46278) Provided verbal/tactile cueing for activities related to improving balance, coordination, kinesthetic sense, posture, motor skill, proprioception and motor activation to allow for proper function of scapular, scapulothoracic and UE control with self care, carrying, lifting, driving/computer work.      Home Exercise Program:    [x] (95096) Reviewed/Progressed HEP activities related to strengthening, flexibility, endurance, ROM of scapular, scapulothoracic and UE control with self care, reaching, carrying, lifting, house/yardwork, driving/computer work  [] (64944) Reviewed/Progressed HEP activities related to improving balance, coordination, kinesthetic sense, posture, motor skill, proprioception of scapular, scapulothoracic and UE control with self care, reaching, carrying, lifting, house/yardwork, driving/computer work      Manual Treatments:  PROM / STM / Oscillations-Mobs:  G-I, II, III, IV (PA's, Inf., Post.)  [] (06218) Provided manual therapy to mobilize soft tissue/joints of cervical/CT, scapular GHJ and UE for the purpose of modulating pain, promoting relaxation,  increasing ROM, reducing/eliminating soft tissue swelling/inflammation/restriction, Tolerance:  [] Patient tolerated treatment well [] Patient limited by fatique  [] Patient limited by pain  [] Patient limited by other medical complications  [] Other:     Prognosis: [] Good [] Fair  [] Poor    Patient Requires Follow-up: [x] Yes  [] No    PLAN: See eval  [] Continue per plan of care [] Alter current plan (see comments)  [x] Plan of care initiated [] Hold pending MD visit [] Discharge    Electronically signed by: Radha Del Cid PT, DPT

## 2018-08-24 ENCOUNTER — HOSPITAL ENCOUNTER (OUTPATIENT)
Dept: PHYSICAL THERAPY | Age: 53
Discharge: HOME | End: 2018-08-25
Admitting: PHYSICAL MEDICINE & REHABILITATION

## 2018-08-24 NOTE — FLOWSHEET NOTE
PROM MT       Cervical mobs   10'  R C5, C7 Grade III-IV          NMR re-education       T-spine Ext       GH depres/compress       Delicia Scap Bio       Scap/GH NMR       Body blade       Wall ball roll       Wall Ball bounce                  Therapeutic Exercise and NMR EXR  [x] (37952) Provided verbal/tactile cueing for activities related to strengthening, flexibility, endurance, ROM  for improvements in scapular, scapulothoracic and UE control with self care, reaching, carrying, lifting, house/yardwork, driving/computer work.    [] (73354) Provided verbal/tactile cueing for activities related to improving balance, coordination, kinesthetic sense, posture, motor skill, proprioception  to assist with  scapular, scapulothoracic and UE control with self care, reaching, carrying, lifting, house/yardwork, driving/computer work. Therapeutic Activities:    [] (21469 or 92354) Provided verbal/tactile cueing for activities related to improving balance, coordination, kinesthetic sense, posture, motor skill, proprioception and motor activation to allow for proper function of scapular, scapulothoracic and UE control with self care, carrying, lifting, driving/computer work.      Home Exercise Program:    [x] (15682) Reviewed/Progressed HEP activities related to strengthening, flexibility, endurance, ROM of scapular, scapulothoracic and UE control with self care, reaching, carrying, lifting, house/yardwork, driving/computer work  [] (58427) Reviewed/Progressed HEP activities related to improving balance, coordination, kinesthetic sense, posture, motor skill, proprioception of scapular, scapulothoracic and UE control with self care, reaching, carrying, lifting, house/yardwork, driving/computer work      Manual Treatments:  PROM / STM / Oscillations-Mobs:  G-I, II, III, IV (PA's, Inf., Post.)  [] (13184) Provided manual therapy to mobilize soft tissue/joints of cervical/CT, scapular GHJ and UE for the purpose of modulating

## 2018-08-31 ENCOUNTER — HOSPITAL ENCOUNTER (OUTPATIENT)
Dept: PHYSICAL THERAPY | Age: 53
Discharge: HOME | End: 2018-09-01
Admitting: PHYSICAL MEDICINE & REHABILITATION

## 2018-08-31 NOTE — FLOWSHEET NOTE
[address], [facility]    Physical Therapy Daily Treatment Note  Date:  2018    Patient Name:  Martin Coombs    :  1965  MRN: [id number]  Restrictions/Precautions:    Medical/Treatment Diagnosis Information:  · Diagnosis: Cervical radiculopahty M54.12  · Treatment Diagnosis: Cervical Radiculopathy with R UE pain  Insurance/Certification information:  PT Insurance Information: [employer] [place] - $1000 deductible, $0 copay, 90/10% coinsurance, PT limits based on medical necessity  Physician Information:  Referring Practitioner: Dr. Mark Lawson of care signed (Y/N): Y    Date of Patient follow up with Physician:     G-Code (if applicable):      Date G-Code Applied:         Progress Note: [x]  Yes  []  No  Next due by: Visit #10      Latex Allergy:  [x]NO      []YES  Preferred Language for Healthcare:   [x]English       []other:    Visit # Insurance Allowable   4 Med necessity     Pain level:  10     SUBJECTIVE:Reports having not taken Tylenol for several days and feels she is doing 85/90% better overall. Only limitation currently is difficulty reaching behind head/neck. Reports performing job duties, watching grandkids and dressing without pain.      OBJECTIVE: See eval  Observation:   Test measurements:      RESTRICTIONS/PRECAUTIONS: None    Exercises/Interventions:   Therapeutic Ex Wt / Resistance Sets/sec Reps Notes   P[place]ley  6'     Cane AAROM flex/press       Shoulder Flex   Shoulder ER black  Green 1  1 30  30    T- band Row black 1 30    Supine SA punches 2# 1 30    Supine ABC 2# 1 2    SL ER 2# 1 30    SL punches 1# 1 20    SL abduction 1# 1 20    Prone rows / Ext 1# 1 20    No Money red 1 20    Standing flex/scap       Standing Punch       HEP initiated              Manual Intervention       Shld /GH Mobs       Post Cap mobs  4'     Thoracic/Rib manipualtion       CT MT/Mobs       PROM MT       Cervical mobs   10'  R C5, C7 Grade III-IV          NMR re-education       Scapular isometrics  30\" 30    GH depres/compress       Delicia Scap Bio       Scap/GH NMR       Body blade       Wall ball roll       Wall Ball bounce                  Therapeutic Exercise and NMR EXR  [x] (40938) Provided verbal/tactile cueing for activities related to strengthening, flexibility, endurance, ROM  for improvements in scapular, scapulothoracic and UE control with self care, reaching, carrying, lifting, house/yardwork, driving/computer work.    [] (41063) Provided verbal/tactile cueing for activities related to improving balance, coordination, kinesthetic sense, posture, motor skill, proprioception  to assist with  scapular, scapulothoracic and UE control with self care, reaching, carrying, lifting, house/yardwork, driving/computer work. Therapeutic Activities:    [] (71594 or 66417) Provided verbal/tactile cueing for activities related to improving balance, coordination, kinesthetic sense, posture, motor skill, proprioception and motor activation to allow for proper function of scapular, scapulothoracic and UE control with self care, carrying, lifting, driving/computer work.      Home Exercise Program:    [x] (23826) Reviewed/Progressed HEP activities related to strengthening, flexibility, endurance, ROM of scapular, scapulothoracic and UE control with self care, reaching, carrying, lifting, house/yardwork, driving/computer work  [] (53101) Reviewed/Progressed HEP activities related to improving balance, coordination, kinesthetic sense, posture, motor skill, proprioception of scapular, scapulothoracic and UE control with self care, reaching, carrying, lifting, house/yardwork, driving/computer work      Manual Treatments:  PROM / STM / Oscillations-Mobs:  G-I, II, III, IV (PA's, Inf., Post.)  [] (71555) Provided manual therapy to mobilize soft tissue/joints of cervical/CT, scapular GHJ and UE for the purpose of modulating pain, promoting relaxation, increasing ROM, reducing/eliminating soft tissue swelling/inflammation/restriction, improving soft tissue extensibility and allowing for proper ROM for normal function with self care, reaching, carrying, lifting, house/yardwork, driving/computer work    Modalities:      Charges:  Timed Code Treatment Minutes: 50   Total Treatment Minutes: 50     [] EVAL  [x] YD(41785) x  2   [] IONTO  [] NMR (38638) x      [] VASO  [x] Manual (88695) x  1    [] Other:  [] TA x       [] Mech Traction (23886)  [] ES(attended) (80936)      [] ES (un) (32767):     GOALS:  Patient stated goal: Wants to decrease pain in the R UE with working     Therapist goals for Patient:   Short Term Goals: To be achieved in: 2 weeks  1. Independent in HEP and progression per patient tolerance, in order to prevent re-injury. 2. Patient will have a decrease in pain to facilitate improvement in movement, function, and ADLs as indicated by Functional Deficits.     Long Term Goals: To be achieved in: 6 weeks  1. Disability index score of 10% or less for the NDI to assist with reaching prior level of function. 2. Patient will demonstrate increased AROM to [facility] of cervical/thoracic spine to allow for proper joint functioning as indicated by patients Functional Deficits. 3. Patient will demonstrate an increase in postural awareness and control and activation of  Deep cervical stabilizers to allow for proper functional mobility as indicated by patients Functional Deficits. 4. Patient will return to lifting and reaching functional activities without increased symptoms or restriction. 5. Patient will report at least 75% reduction in frequency and intensity of numbness / tingling in the R UE      Progression Towards Functional goals:  [x] Patient is progressing as expected towards functional goals listed. [] Progression is slowed due to complexities listed. [] Progression has been slowed due to co-morbidities.   [] Plan just implemented, too soon to assess goals progression  [] Other:     ASSESSMENT: Added scapular isometrics and bilateral standing ER to program this date. Pt tolerated upgrades to weight and resistance band this date as well without issues. Pt appears to be progressing well with therapy and performing functional daily and work related tasks without difficulty.       Treatment/Activity Tolerance:  [] Patient tolerated treatment well [] Patient limited by fatique  [] Patient limited by pain  [] Patient limited by other medical complications  [] Other:     Prognosis: [] Good [] Fair  [] Poor    Patient Requires Follow-up: [x] Yes  [] No    PLAN: Per conversation with PT, pt will follow up in 10-14 days with possible discharge at that time if still doing well.    [] Continue per plan of care [x] Alter current plan (see comments)  [] Plan of care initiated [] Hold pending MD visit [] Discharge    Electronically signed by: Em Dejesus [id number]

## 2018-09-01 ENCOUNTER — HOSPITAL ENCOUNTER (OUTPATIENT)
Dept: PHYSICAL THERAPY | Age: 53
Discharge: HOME | End: 2018-09-01
Attending: PHYSICAL MEDICINE & REHABILITATION | Admitting: PHYSICAL MEDICINE & REHABILITATION

## 2018-09-17 ENCOUNTER — OFFICE VISIT (OUTPATIENT)
Dept: ORTHOPEDIC SURGERY | Age: 53
End: 2018-09-17

## 2018-09-17 VITALS — BODY MASS INDEX: 33.99 KG/M2 | WEIGHT: 204 LBS | HEIGHT: 65 IN

## 2018-09-17 DIAGNOSIS — M54.12 CERVICAL RADICULOPATHY: ICD-10-CM

## 2018-09-17 DIAGNOSIS — M50.20 CERVICAL DISC HERNIATION: Primary | ICD-10-CM

## 2018-09-17 PROCEDURE — 99213 OFFICE O/P EST LOW 20 MIN: CPT | Performed by: PHYSICAL MEDICINE & REHABILITATION

## 2018-09-17 NOTE — PROGRESS NOTES
Follow up: 51 brock  [place]  1965  [id number]      CHIEF COMPLAINT:    Chief Complaint   Patient presents with    Neck Pain     F/U PT CSP; Patient states she felt the PT would aggravate her neck more. She complains of soreness for a few days after each session. HISTORY OF PRESENT ILLNESS:  Ms. Ashleigh Etienne is a 46 y.o. female returns for a follow up visit for multiple medical problems. Her current presenting problems are   1. Cervical disc herniation    2. Cervical radiculopathy    . As per information/history obtained from the PADT(patient assessment and documentation tool) - She complains of pain in the neck with radiation to the arms Right She rates the pain 1/10 and describes it as dull. Pain is made worse by: work. She denies side effects from the current pain regimen. Patient reports that since the last follow up visit the physical functioning is unchanged, family/social relationships are unchanged, mood is unchanged and sleep patterns are unchanged, and that the overall functioning is unchanged. Patient denies neurological bowel or bladder. She presents after trial of physical therapy. She completed physical therapy between August 16 to September 14, 2018. She reports overall she no longer has any significant pain. She denies having any weakness. She is return back to work without any trouble.       Associated signs and symptoms:   Neurogenic bowel or bladder symptoms:  no   Perceived weakness:  no   Difficulty walking:  no              Past Medical History:   Past Medical History:   Diagnosis Date    Anxiety     Endometriosis 1987    laporoscopy: endometriosis 1987    Migraine, unspecified, without mention of intractable migraine without mention of status migrainosus       Past Surgical History:     Past Surgical History:   Procedure Laterality Date    CARPAL TUNNEL RELEASE Right     ENDOMETRIAL ABLATION      LAPAROSCOPY      for endometriosis     Current and pinprick  · Coordination of the upper and lower extremities are normal.    CERVICAL EXAMINATION:  · Inspection: Local inspection shows no step-off or bruising. Cervical alignment is normal. No instability is noted. · Palpation and Percussion: No evidence of tenderness at the midline. Paraspinal tenderness is not present. There is no paraspinal spasm. Skin:There are no rashes, ulcerations or lesions  · Range of Motion:  pain-free ROM   · Strength: 5/5 bilateral upper extremities  · Special Tests:   Spurling's and Adam's are negative bilaterally. Newton and Impingement tests are negative bilaterally. · Skin:There are no rashes, ulcerations or lesions in right & left upper extremities. · Reflexes: Bilaterally triceps, biceps and brachioradialis are 2+. Clonus absent bilaterally at the feet. No pathological reflexes are noted. · Gait & station: normal, patient ambulates without assistance  · Additional Examinations:  · RIGHT UPPER EXTREMITY:  Inspection/examination of the right upper extremity does not show any tenderness, deformity or injury. Range of motion is unremarkable and pain-free. There is no gross instability. There are no rashes, ulcerations or lesions. Strength and tone are normal. No atrophy or abnormal movements are noted. · LEFT UPPER EXTREMITY: Inspection/examination of the left upper extremity does not show any tenderness, deformity or injury. Range of motion is unremarkable and pain-free. There is no gross instability. There are no rashes, ulcerations or lesions. Strength and tone are normal. No atrophy or abnormal movements are noted.     Diagnostic Testing:    MR cervical spine shows 6/1/18  Disc and osteophytes result in stenosis of the thecal sac and narrowing of   the neural foramina particularly at C5-6 and C6-7 as discussed above.       There is a 1.7 cm nodule in the left lobe of the thyroid and two small   nodules in the right lobe of the thyroid.  Thyroid ultrasound is suggested to   further evaluate       Results for orders placed or performed during the hospital encounter of 07/20/18   Protime-INR   Result Value Ref Range    Protime 11.9 9.8 - 13.0 sec    INR 1.04 0.86 - 1.14     Impression:       1. Cervical disc herniation    2. Cervical radiculopathy        Plan:  Clinical Course: is improving    1. Medications:  Continue anti-inflammatories with appropriate GI Precautions including to stop if develop dark tarry stools or GI upset and to take with food. 2. PT:  Encouraged to continue with HEP. 3. Further studies:  No further studies. 4. Interventional:  None further  5. Follow up:  4-6 weeks          Corinna Pereyra MD, TARSHA, [facility]  Board Certified in [address] [place] [address] Certified and Fellowship Trained in [facility] ([facility])             This dictation was performed with a verbal recognition program [facility]) and it was checked for errors. It is possible that there are still dictated errors within this office note. If so, please bring any errors to my attention for an addendum. All efforts were made to ensure that this office note is accurate.

## 2018-10-04 ENCOUNTER — HOSPITAL ENCOUNTER (OUTPATIENT)
Dept: WOMENS IMAGING | Age: 53
Discharge: HOME | End: 2018-10-04
Payer: COMMERCIAL

## 2018-10-04 DIAGNOSIS — Z12.31 ENCOUNTER FOR SCREENING MAMMOGRAM FOR BREAST CANCER: ICD-10-CM

## 2018-10-04 PROCEDURE — 77067 SCR MAMMO BI INCL CAD: CPT

## 2018-11-26 ENCOUNTER — OFFICE VISIT (OUTPATIENT)
Dept: ORTHOPEDIC SURGERY | Age: 53
End: 2018-11-26
Payer: COMMERCIAL

## 2018-11-26 VITALS
BODY MASS INDEX: 33.98 KG/M2 | DIASTOLIC BLOOD PRESSURE: 76 MMHG | SYSTOLIC BLOOD PRESSURE: 130 MMHG | WEIGHT: 203.93 LBS | HEIGHT: 65 IN

## 2018-11-26 DIAGNOSIS — M50.30 DDD (DEGENERATIVE DISC DISEASE), CERVICAL: ICD-10-CM

## 2018-11-26 DIAGNOSIS — M54.12 CERVICAL RADICULOPATHY: ICD-10-CM

## 2018-11-26 DIAGNOSIS — M48.02 FORAMINAL STENOSIS OF CERVICAL REGION: ICD-10-CM

## 2018-11-26 DIAGNOSIS — M48.02 CERVICAL SPINAL STENOSIS: Primary | ICD-10-CM

## 2018-11-26 PROCEDURE — 99214 OFFICE O/P EST MOD 30 MIN: CPT | Performed by: PHYSICAL MEDICINE & REHABILITATION

## 2018-11-26 NOTE — PROGRESS NOTES
mention of intractable migraine without mention of status migrainosus       Past Surgical History:     Past Surgical History:   Procedure Laterality Date    CARPAL TUNNEL RELEASE Right     ENDOMETRIAL ABLATION      LAPAROSCOPY      for endometriosis     Current Medications:     Current Outpatient Prescriptions:     ibuprofen (ADVIL;MOTRIN) 200 MG tablet, Take 200 mg by mouth every 6 hours as needed for Pain, Disp: , Rfl:     oxybutynin (DITROPAN) 5 MG tablet, Take 5 mg by mouth daily , Disp: , Rfl:     citalopram (CELEXA) 40 MG tablet, Take 40 mg by mouth daily. , Disp: , Rfl:   Allergies:  Penicillins and Sulfa antibiotics  Social History:    reports that she has never smoked. She has never used smokeless tobacco. She reports that she drinks alcohol. She reports that she does not use drugs. Family History:   Family History   Problem Relation Age of Onset    Ulcerative Colitis Mother     Hypertension Mother     Asthma Mother     Cancer Mother         lung    Kidney Disease Mother     Heart Disease Father         66 yo    Diabetes Father     Asthma Sister     Cancer Sister         brain    Seizures Sister     Kidney Disease Sister     Diabetes Sister     Kidney Disease Brother     Diabetes Brother     Stroke Maternal Grandfather        REVIEW OF SYSTEMS:   CONSTITUTIONAL: Denies unexplained weight loss, fevers, chills or fatigue  NEUROLOGICAL: Denies unsteady gait or progressive weakness  MUSCULOSKELETAL: Denies joint swelling or redness  GI: Denies nausea, vomiting, diarrhea   : Denies bowel or bladder issues       PHYSICAL EXAM:    Vitals: Blood pressure 130/76, height 5' 5\" (1.651 m), weight 203 lb 14.8 oz (92.5 kg). GENERAL EXAM:  · General Apparence: Patient is adequately groomed with no evidence of malnutrition. · Psychiatric: Orientation: The patient is oriented to time, place and person.  The patient's mood and affect are appropriate   · Vascular: Examination reveals no swelling and symptoms appear prior to the next scheduled visit. She is specifically instructed to contact the office between now & her scheduled appointment if she has concerns related to her condition or if she needs assistance in scheduling the above tests. She is welcome to call for an appointment sooner if she has any additional concerns or questions. IRoxanna ATC, am scribing for and in the presence of Dr. Valentín Loyd. 11/26/18 3:38 PM Roxanna Lassiter. I, Dr. Valentín Loyd, personally performed the services described in this documentation as scribed by Roxanna Cam ATC in my presence and it is both accurate and complete. Marita Moody. Wesley Trejo MD, TARSHA, [facility]  Board Certified in [address] [place] [address] Certified and Fellowship Trained in [facility] ([facility])             This dictation was performed with a verbal recognition program [facility]) and it was checked for errors. It is possible that there are still dictated errors within this office note. If so, please bring any errors to my attention for an addendum. All efforts were made to ensure that this office note is accurate.

## 2018-11-26 NOTE — LETTER
Please schedule the following with:     Date:   2018 @ 8:00     Account: [de-identified]  Patient: Helena Duane    : 1965  Address:  [address] [address]    Phone (H):  [phone] (home) [phone] (work)     ----------------------------------------------------------------------------------------------  Diagnosis:     ICD-10-CM    1. Cervical spinal stenosis M48.02    2. DDD (degenerative disc disease), cervical M50.30    3. 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 Cervical radiculopathy M54.12          Levels: C6/C7  midline  Interlaminar JOE  CPT Codes 67841    ----------------------------------------------------------------------------------------------  Injection #   [id number]    Attending Physician       Garret العلي.  Sammy Ferrari MD.      ----------------------------------------------------------------------------------------------  Injection Scheduled For:    At:    [facility]    Pre-Cert#    2nd Insurance     Pre-Cert#    Comments:    · Infection control  · Tested positive for MRSA in past 12 months:  no  · Tested positive for MSSA \"staph infection\" in past 12 months: no  · Tested positive for VRE (Vancomycin Resistant Enterococci) in past 12 months:   no  · Currently on any antibiotics for an infection: no  · Anticoagulants:  · On a blood thinner:  no   · Any history of bleeding disorder: no   · Advanced Liver disease: no   · Advanced Renal disease: no   · Glaucoma: no   · Diabetes: no     Sedation:  Yes  -----------------------------------------------------------------------------------------------  Allergies   Allergen Reactions    Penicillins Rash    Sulfa Antibiotics Rash

## 2018-12-12 ENCOUNTER — TELEPHONE (OUTPATIENT)
Dept: ORTHOPEDIC SURGERY | Age: 53
End: 2018-12-12

## 2018-12-21 ENCOUNTER — HOSPITAL ENCOUNTER (OUTPATIENT)
Age: 53
Setting detail: OUTPATIENT SURGERY
Discharge: HOME | End: 2018-12-21
Attending: PHYSICAL MEDICINE & REHABILITATION | Admitting: PHYSICAL MEDICINE & REHABILITATION
Payer: COMMERCIAL

## 2018-12-21 ENCOUNTER — APPOINTMENT (OUTPATIENT)
Dept: GENERAL RADIOLOGY | Age: 53
End: 2018-12-21
Attending: PHYSICAL MEDICINE & REHABILITATION
Payer: COMMERCIAL

## 2018-12-21 VITALS
WEIGHT: 186 LBS | BODY MASS INDEX: 31.76 KG/M2 | HEIGHT: 64 IN | SYSTOLIC BLOOD PRESSURE: 141 MMHG | HEART RATE: 57 BPM | RESPIRATION RATE: 20 BRPM | DIASTOLIC BLOOD PRESSURE: 79 MMHG | TEMPERATURE: 98.3 F | OXYGEN SATURATION: 100 %

## 2018-12-21 PROCEDURE — 2709999900 HC NON-CHARGEABLE SUPPLY: Performed by: PHYSICAL MEDICINE & REHABILITATION

## 2018-12-21 PROCEDURE — 77003 FLUOROGUIDE FOR SPINE INJECT: CPT

## 2018-12-21 PROCEDURE — 2580000003 HC RX 258: Performed by: PHYSICAL MEDICINE & REHABILITATION

## 2018-12-21 PROCEDURE — 2500000003 HC RX 250 WO HCPCS: Performed by: PHYSICAL MEDICINE & REHABILITATION

## 2018-12-21 PROCEDURE — 6360000002 HC RX W HCPCS: Performed by: PHYSICAL MEDICINE & REHABILITATION

## 2018-12-21 PROCEDURE — 99152 MOD SED SAME PHYS/QHP 5/>YRS: CPT | Performed by: PHYSICAL MEDICINE & REHABILITATION

## 2018-12-21 PROCEDURE — 3610000054 HC PAIN LEVEL 3 BASE (NON-OR): Performed by: PHYSICAL MEDICINE & REHABILITATION

## 2018-12-21 RX ORDER — LIDOCAINE HYDROCHLORIDE 10 MG/ML
INJECTION, SOLUTION INFILTRATION; PERINEURAL
Status: COMPLETED | OUTPATIENT
Start: 2018-12-21 | End: 2018-12-21

## 2018-12-21 RX ORDER — MIDAZOLAM HYDROCHLORIDE 1 MG/ML
INJECTION INTRAMUSCULAR; INTRAVENOUS
Status: COMPLETED | OUTPATIENT
Start: 2018-12-21 | End: 2018-12-21

## 2018-12-21 RX ORDER — 0.9 % SODIUM CHLORIDE 0.9 %
VIAL (ML) INJECTION
Status: COMPLETED | OUTPATIENT
Start: 2018-12-21 | End: 2018-12-21

## 2018-12-21 RX ORDER — DEXAMETHASONE SODIUM PHOSPHATE 10 MG/ML
INJECTION, SOLUTION INTRAMUSCULAR; INTRAVENOUS
Status: COMPLETED | OUTPATIENT
Start: 2018-12-21 | End: 2018-12-21

## 2018-12-21 ASSESSMENT — PAIN DESCRIPTION - DESCRIPTORS: DESCRIPTORS: BURNING

## 2018-12-21 ASSESSMENT — PAIN - FUNCTIONAL ASSESSMENT: PAIN_FUNCTIONAL_ASSESSMENT: 0-10

## 2018-12-21 ASSESSMENT — PAIN SCALES - GENERAL: PAINLEVEL_OUTOF10: 4

## 2019-01-18 ENCOUNTER — OFFICE VISIT (OUTPATIENT)
Dept: ORTHOPEDIC SURGERY | Age: 54
End: 2019-01-18
Payer: COMMERCIAL

## 2019-01-18 VITALS
BODY MASS INDEX: 31.77 KG/M2 | DIASTOLIC BLOOD PRESSURE: 76 MMHG | WEIGHT: 186.07 LBS | HEIGHT: 64 IN | SYSTOLIC BLOOD PRESSURE: 130 MMHG

## 2019-01-18 DIAGNOSIS — M75.21 BICEPS TENDONITIS ON RIGHT: Primary | ICD-10-CM

## 2019-01-18 DIAGNOSIS — M48.02 CERVICAL STENOSIS OF SPINE: ICD-10-CM

## 2019-01-18 DIAGNOSIS — M75.81 RIGHT ROTATOR CUFF TENDINITIS: ICD-10-CM

## 2019-01-18 PROCEDURE — 99213 OFFICE O/P EST LOW 20 MIN: CPT | Performed by: PHYSICAL MEDICINE & REHABILITATION

## 2019-01-21 ENCOUNTER — TELEPHONE (OUTPATIENT)
Dept: ORTHOPEDIC SURGERY | Age: 54
End: 2019-01-21

## 2019-01-29 ENCOUNTER — HOSPITAL ENCOUNTER (OUTPATIENT)
Dept: MRI IMAGING | Age: 54
Discharge: HOME | End: 2019-01-29
Payer: COMMERCIAL

## 2019-01-29 DIAGNOSIS — M75.81 RIGHT ROTATOR CUFF TENDINITIS: ICD-10-CM

## 2019-01-29 DIAGNOSIS — M75.21 BICEPS TENDONITIS ON RIGHT: ICD-10-CM

## 2019-01-29 PROCEDURE — 73221 MRI JOINT UPR EXTREM W/O DYE: CPT

## 2019-02-15 ENCOUNTER — OFFICE VISIT (OUTPATIENT)
Dept: ORTHOPEDIC SURGERY | Age: 54
End: 2019-02-15
Payer: COMMERCIAL

## 2019-02-15 VITALS — HEIGHT: 64 IN | WEIGHT: 186 LBS | BODY MASS INDEX: 31.76 KG/M2

## 2019-02-15 DIAGNOSIS — M75.121 COMPLETE TEAR OF RIGHT ROTATOR CUFF: Primary | ICD-10-CM

## 2019-02-15 DIAGNOSIS — M75.21 BICIPITAL TENDONITIS OF RIGHT SHOULDER: ICD-10-CM

## 2019-02-15 PROCEDURE — 99213 OFFICE O/P EST LOW 20 MIN: CPT | Performed by: PHYSICAL MEDICINE & REHABILITATION

## 2019-02-22 ENCOUNTER — OFFICE VISIT (OUTPATIENT)
Dept: ORTHOPEDIC SURGERY | Age: 54
End: 2019-02-22
Payer: COMMERCIAL

## 2019-02-22 VITALS
HEART RATE: 60 BPM | BODY MASS INDEX: 31.77 KG/M2 | WEIGHT: 186.07 LBS | HEIGHT: 64 IN | DIASTOLIC BLOOD PRESSURE: 88 MMHG | SYSTOLIC BLOOD PRESSURE: 140 MMHG | RESPIRATION RATE: 17 BRPM

## 2019-02-22 DIAGNOSIS — M25.511 RIGHT SHOULDER PAIN, UNSPECIFIED CHRONICITY: ICD-10-CM

## 2019-02-22 DIAGNOSIS — M19.011 ARTHRITIS OF RIGHT ACROMIOCLAVICULAR JOINT: ICD-10-CM

## 2019-02-22 DIAGNOSIS — M75.121 COMPLETE TEAR OF RIGHT ROTATOR CUFF: Primary | ICD-10-CM

## 2019-02-22 PROCEDURE — 99243 OFF/OP CNSLTJ NEW/EST LOW 30: CPT | Performed by: ORTHOPAEDIC SURGERY

## 2019-03-04 ENCOUNTER — OFFICE VISIT (OUTPATIENT)
Dept: FAMILY MEDICINE CLINIC | Age: 54
End: 2019-03-04
Payer: COMMERCIAL

## 2019-03-04 VITALS
OXYGEN SATURATION: 98 % | TEMPERATURE: 98.6 F | HEIGHT: 64 IN | BODY MASS INDEX: 33.97 KG/M2 | DIASTOLIC BLOOD PRESSURE: 68 MMHG | SYSTOLIC BLOOD PRESSURE: 100 MMHG | HEART RATE: 69 BPM | WEIGHT: 199 LBS

## 2019-03-04 DIAGNOSIS — M75.101 TEAR OF RIGHT ROTATOR CUFF, UNSPECIFIED TEAR EXTENT: ICD-10-CM

## 2019-03-04 DIAGNOSIS — Z01.818 PREOP EXAMINATION: Primary | ICD-10-CM

## 2019-03-04 PROCEDURE — 99242 OFF/OP CONSLTJ NEW/EST SF 20: CPT | Performed by: PHYSICIAN ASSISTANT

## 2019-03-04 ASSESSMENT — PATIENT HEALTH QUESTIONNAIRE - PHQ9
SUM OF ALL RESPONSES TO PHQ QUESTIONS 1-9: 0
SUM OF ALL RESPONSES TO PHQ9 QUESTIONS 1 & 2: 0
1. LITTLE INTEREST OR PLEASURE IN DOING THINGS: 0
SUM OF ALL RESPONSES TO PHQ QUESTIONS 1-9: 0
2. FEELING DOWN, DEPRESSED OR HOPELESS: 0

## 2019-03-11 ENCOUNTER — EMPLOYEE WELLNESS (OUTPATIENT)
Dept: OTHER | Age: 54
End: 2019-03-11

## 2019-03-11 LAB
CHOLESTEROL, TOTAL: 172 MG/DL (ref 0–199)
GLUCOSE BLD-MCNC: 89 MG/DL (ref 70–99)
HDLC SERPL-MCNC: 38 MG/DL (ref 40–60)
LDL CHOLESTEROL CALCULATED: 108 MG/DL
TRIGL SERPL-MCNC: 129 MG/DL (ref 0–150)

## 2019-03-12 ENCOUNTER — ANESTHESIA EVENT (OUTPATIENT)
Dept: OPERATING ROOM | Age: 54
End: 2019-03-12
Payer: COMMERCIAL

## 2019-03-13 ENCOUNTER — ANESTHESIA (OUTPATIENT)
Dept: OPERATING ROOM | Age: 54
End: 2019-03-13
Payer: COMMERCIAL

## 2019-03-13 ENCOUNTER — HOSPITAL ENCOUNTER (OUTPATIENT)
Age: 54
Setting detail: OUTPATIENT SURGERY
Discharge: HOME | End: 2019-03-13
Attending: ORTHOPAEDIC SURGERY | Admitting: ORTHOPAEDIC SURGERY
Payer: COMMERCIAL

## 2019-03-13 VITALS
HEIGHT: 64 IN | BODY MASS INDEX: 33.8 KG/M2 | HEART RATE: 77 BPM | WEIGHT: 197.97 LBS | SYSTOLIC BLOOD PRESSURE: 146 MMHG | TEMPERATURE: 97 F | OXYGEN SATURATION: 95 % | RESPIRATION RATE: 20 BRPM | DIASTOLIC BLOOD PRESSURE: 80 MMHG

## 2019-03-13 VITALS
TEMPERATURE: 62.6 F | OXYGEN SATURATION: 100 % | DIASTOLIC BLOOD PRESSURE: 56 MMHG | RESPIRATION RATE: 22 BRPM | SYSTOLIC BLOOD PRESSURE: 111 MMHG

## 2019-03-13 DIAGNOSIS — Z98.890 S/P ROTATOR CUFF REPAIR: Primary | ICD-10-CM

## 2019-03-13 LAB — PREGNANCY, URINE: NEGATIVE

## 2019-03-13 PROCEDURE — 2580000003 HC RX 258: Performed by: NURSE ANESTHETIST, CERTIFIED REGISTERED

## 2019-03-13 PROCEDURE — 6360000002 HC RX W HCPCS: Performed by: ORTHOPAEDIC SURGERY

## 2019-03-13 PROCEDURE — 2500000003 HC RX 250 WO HCPCS: Performed by: ORTHOPAEDIC SURGERY

## 2019-03-13 PROCEDURE — 7100000001 HC PACU RECOVERY - ADDTL 15 MIN: Performed by: ORTHOPAEDIC SURGERY

## 2019-03-13 PROCEDURE — 6360000002 HC RX W HCPCS

## 2019-03-13 PROCEDURE — 3700000001 HC ADD 15 MINUTES (ANESTHESIA): Performed by: ORTHOPAEDIC SURGERY

## 2019-03-13 PROCEDURE — 2709999900 HC NON-CHARGEABLE SUPPLY: Performed by: ORTHOPAEDIC SURGERY

## 2019-03-13 PROCEDURE — 84703 CHORIONIC GONADOTROPIN ASSAY: CPT

## 2019-03-13 PROCEDURE — 7100000010 HC PHASE II RECOVERY - FIRST 15 MIN: Performed by: ORTHOPAEDIC SURGERY

## 2019-03-13 PROCEDURE — 2580000003 HC RX 258: Performed by: ANESTHESIOLOGY

## 2019-03-13 PROCEDURE — 6360000002 HC RX W HCPCS: Performed by: NURSE ANESTHETIST, CERTIFIED REGISTERED

## 2019-03-13 PROCEDURE — 3700000000 HC ANESTHESIA ATTENDED CARE: Performed by: ORTHOPAEDIC SURGERY

## 2019-03-13 PROCEDURE — 2720000010 HC SURG SUPPLY STERILE: Performed by: ORTHOPAEDIC SURGERY

## 2019-03-13 PROCEDURE — C1713 ANCHOR/SCREW BN/BN,TIS/BN: HCPCS | Performed by: ORTHOPAEDIC SURGERY

## 2019-03-13 PROCEDURE — 3600000014 HC SURGERY LEVEL 4 ADDTL 15MIN: Performed by: ORTHOPAEDIC SURGERY

## 2019-03-13 PROCEDURE — 7100000000 HC PACU RECOVERY - FIRST 15 MIN: Performed by: ORTHOPAEDIC SURGERY

## 2019-03-13 PROCEDURE — 7100000011 HC PHASE II RECOVERY - ADDTL 15 MIN: Performed by: ORTHOPAEDIC SURGERY

## 2019-03-13 PROCEDURE — 2500000003 HC RX 250 WO HCPCS: Performed by: NURSE ANESTHETIST, CERTIFIED REGISTERED

## 2019-03-13 PROCEDURE — 3600000004 HC SURGERY LEVEL 4 BASE: Performed by: ORTHOPAEDIC SURGERY

## 2019-03-13 DEVICE — ANCHOR SUT DIA2.9MM NO2 MAXBRAID DBL LD SFT W/ CUT NDL: Type: IMPLANTABLE DEVICE | Site: SHOULDER | Status: FUNCTIONAL

## 2019-03-13 DEVICE — ANCHOR SUT DIA4.5MM PEEK OPTMA LINK KNOTLESS CLEAT EYELET: Type: IMPLANTABLE DEVICE | Site: SHOULDER | Status: FUNCTIONAL

## 2019-03-13 RX ORDER — SODIUM CHLORIDE 9 MG/ML
INJECTION, SOLUTION INTRAVENOUS CONTINUOUS
Status: DISCONTINUED | OUTPATIENT
Start: 2019-03-13 | End: 2019-03-13 | Stop reason: HOSPADM

## 2019-03-13 RX ORDER — BUPIVACAINE HYDROCHLORIDE AND EPINEPHRINE 5; 5 MG/ML; UG/ML
INJECTION, SOLUTION EPIDURAL; INTRACAUDAL; PERINEURAL
Status: COMPLETED | OUTPATIENT
Start: 2019-03-13 | End: 2019-03-13

## 2019-03-13 RX ORDER — PROPOFOL 10 MG/ML
INJECTION, EMULSION INTRAVENOUS PRN
Status: DISCONTINUED | OUTPATIENT
Start: 2019-03-13 | End: 2019-03-13 | Stop reason: SDUPTHER

## 2019-03-13 RX ORDER — OXYCODONE HYDROCHLORIDE AND ACETAMINOPHEN 5; 325 MG/1; MG/1
1 TABLET ORAL EVERY 4 HOURS PRN
Qty: 35 TABLET | Refills: 0 | Status: SHIPPED | OUTPATIENT
Start: 2019-03-13 | End: 2019-03-20

## 2019-03-13 RX ORDER — EPINEPHRINE 1 MG/ML
INJECTION, SOLUTION, CONCENTRATE INTRAVENOUS
Status: COMPLETED | OUTPATIENT
Start: 2019-03-13 | End: 2019-03-13

## 2019-03-13 RX ORDER — EPHEDRINE SULFATE/0.9% NACL/PF 50 MG/5 ML
SYRINGE (ML) INTRAVENOUS PRN
Status: DISCONTINUED | OUTPATIENT
Start: 2019-03-13 | End: 2019-03-13 | Stop reason: SDUPTHER

## 2019-03-13 RX ORDER — SODIUM CHLORIDE 0.9 % (FLUSH) 0.9 %
10 SYRINGE (ML) INJECTION PRN
Status: DISCONTINUED | OUTPATIENT
Start: 2019-03-13 | End: 2019-03-13 | Stop reason: HOSPADM

## 2019-03-13 RX ORDER — LIDOCAINE HYDROCHLORIDE 20 MG/ML
INJECTION, SOLUTION EPIDURAL; INFILTRATION; INTRACAUDAL; PERINEURAL PRN
Status: DISCONTINUED | OUTPATIENT
Start: 2019-03-13 | End: 2019-03-13 | Stop reason: SDUPTHER

## 2019-03-13 RX ORDER — SODIUM CHLORIDE 0.9 % (FLUSH) 0.9 %
10 SYRINGE (ML) INJECTION EVERY 12 HOURS SCHEDULED
Status: DISCONTINUED | OUTPATIENT
Start: 2019-03-13 | End: 2019-03-13 | Stop reason: HOSPADM

## 2019-03-13 RX ORDER — FENTANYL CITRATE 50 UG/ML
50 INJECTION, SOLUTION INTRAMUSCULAR; INTRAVENOUS EVERY 5 MIN PRN
Status: DISCONTINUED | OUTPATIENT
Start: 2019-03-13 | End: 2019-03-13 | Stop reason: HOSPADM

## 2019-03-13 RX ORDER — DEXAMETHASONE SODIUM PHOSPHATE 4 MG/ML
INJECTION, SOLUTION INTRA-ARTICULAR; INTRALESIONAL; INTRAMUSCULAR; INTRAVENOUS; SOFT TISSUE PRN
Status: DISCONTINUED | OUTPATIENT
Start: 2019-03-13 | End: 2019-03-13 | Stop reason: SDUPTHER

## 2019-03-13 RX ORDER — FENTANYL CITRATE 50 UG/ML
INJECTION, SOLUTION INTRAMUSCULAR; INTRAVENOUS PRN
Status: DISCONTINUED | OUTPATIENT
Start: 2019-03-13 | End: 2019-03-13 | Stop reason: SDUPTHER

## 2019-03-13 RX ORDER — ONDANSETRON 2 MG/ML
4 INJECTION INTRAMUSCULAR; INTRAVENOUS
Status: DISCONTINUED | OUTPATIENT
Start: 2019-03-13 | End: 2019-03-13 | Stop reason: HOSPADM

## 2019-03-13 RX ORDER — ONDANSETRON 2 MG/ML
INJECTION INTRAMUSCULAR; INTRAVENOUS PRN
Status: DISCONTINUED | OUTPATIENT
Start: 2019-03-13 | End: 2019-03-13 | Stop reason: SDUPTHER

## 2019-03-13 RX ORDER — CLINDAMYCIN PHOSPHATE 900 MG/50ML
900 INJECTION INTRAVENOUS
Status: COMPLETED | OUTPATIENT
Start: 2019-03-13 | End: 2019-03-13

## 2019-03-13 RX ORDER — FENTANYL CITRATE 50 UG/ML
25 INJECTION, SOLUTION INTRAMUSCULAR; INTRAVENOUS EVERY 5 MIN PRN
Status: DISCONTINUED | OUTPATIENT
Start: 2019-03-13 | End: 2019-03-13 | Stop reason: HOSPADM

## 2019-03-13 RX ORDER — ROCURONIUM BROMIDE 10 MG/ML
INJECTION, SOLUTION INTRAVENOUS PRN
Status: DISCONTINUED | OUTPATIENT
Start: 2019-03-13 | End: 2019-03-13 | Stop reason: SDUPTHER

## 2019-03-13 RX ORDER — CYCLOBENZAPRINE HCL 10 MG
10 TABLET ORAL 3 TIMES DAILY PRN
Qty: 30 TABLET | Refills: 0 | Status: SHIPPED | OUTPATIENT
Start: 2019-03-13 | End: 2019-03-23

## 2019-03-13 RX ADMIN — Medication 10 MG: at 15:01

## 2019-03-13 RX ADMIN — FENTANYL CITRATE 100 MCG: 50 INJECTION INTRAMUSCULAR; INTRAVENOUS at 14:12

## 2019-03-13 RX ADMIN — SUGAMMADEX 200 MG: 100 INJECTION, SOLUTION INTRAVENOUS at 15:37

## 2019-03-13 RX ADMIN — ONDANSETRON 4 MG: 2 INJECTION INTRAMUSCULAR; INTRAVENOUS at 14:56

## 2019-03-13 RX ADMIN — SODIUM CHLORIDE: 9 INJECTION, SOLUTION INTRAVENOUS at 12:58

## 2019-03-13 RX ADMIN — PHENYLEPHRINE HYDROCHLORIDE 100 MCG: 10 INJECTION, SOLUTION INTRAMUSCULAR; INTRAVENOUS; SUBCUTANEOUS at 14:47

## 2019-03-13 RX ADMIN — ROCURONIUM BROMIDE 50 MG: 10 INJECTION INTRAVENOUS at 14:15

## 2019-03-13 RX ADMIN — Medication 10 MG: at 14:42

## 2019-03-13 RX ADMIN — Medication 15 MG: at 15:22

## 2019-03-13 RX ADMIN — DEXAMETHASONE SODIUM PHOSPHATE 4 MG: 4 INJECTION, SOLUTION INTRAMUSCULAR; INTRAVENOUS at 14:22

## 2019-03-13 RX ADMIN — Medication 15 MG: at 14:25

## 2019-03-13 RX ADMIN — CLINDAMYCIN PHOSPHATE 600 MG: 18 INJECTION, SOLUTION INTRAMUSCULAR; INTRAVENOUS at 14:09

## 2019-03-13 RX ADMIN — PROPOFOL 175 MG: 10 INJECTION, EMULSION INTRAVENOUS at 14:15

## 2019-03-13 RX ADMIN — LIDOCAINE HYDROCHLORIDE 100 MG: 20 INJECTION, SOLUTION EPIDURAL; INFILTRATION; INTRACAUDAL; PERINEURAL at 14:12

## 2019-03-13 ASSESSMENT — PULMONARY FUNCTION TESTS
PIF_VALUE: 17
PIF_VALUE: 0
PIF_VALUE: 4
PIF_VALUE: 19
PIF_VALUE: 21
PIF_VALUE: 21
PIF_VALUE: 4
PIF_VALUE: 21
PIF_VALUE: 22
PIF_VALUE: 21
PIF_VALUE: 22
PIF_VALUE: 21
PIF_VALUE: 20
PIF_VALUE: 19
PIF_VALUE: 9
PIF_VALUE: 22
PIF_VALUE: 19
PIF_VALUE: 21
PIF_VALUE: 17
PIF_VALUE: 20
PIF_VALUE: 21
PIF_VALUE: 23
PIF_VALUE: 9
PIF_VALUE: 20
PIF_VALUE: 20
PIF_VALUE: 21
PIF_VALUE: 9
PIF_VALUE: 21
PIF_VALUE: 19
PIF_VALUE: 20
PIF_VALUE: 21
PIF_VALUE: 17
PIF_VALUE: 21
PIF_VALUE: 3
PIF_VALUE: 10
PIF_VALUE: 21
PIF_VALUE: 21
PIF_VALUE: 22
PIF_VALUE: 20
PIF_VALUE: 20
PIF_VALUE: 21
PIF_VALUE: 22
PIF_VALUE: 10
PIF_VALUE: 18
PIF_VALUE: 21
PIF_VALUE: 22
PIF_VALUE: 20
PIF_VALUE: 0
PIF_VALUE: 22
PIF_VALUE: 21
PIF_VALUE: 22
PIF_VALUE: 0
PIF_VALUE: 21
PIF_VALUE: 5
PIF_VALUE: 20
PIF_VALUE: 22
PIF_VALUE: 21
PIF_VALUE: 18
PIF_VALUE: 20
PIF_VALUE: 19
PIF_VALUE: 19
PIF_VALUE: 18
PIF_VALUE: 19
PIF_VALUE: 20
PIF_VALUE: 3
PIF_VALUE: 2
PIF_VALUE: 20
PIF_VALUE: 3
PIF_VALUE: 17
PIF_VALUE: 22
PIF_VALUE: 0
PIF_VALUE: 19
PIF_VALUE: 21
PIF_VALUE: 20
PIF_VALUE: 21
PIF_VALUE: 0
PIF_VALUE: 0
PIF_VALUE: 24
PIF_VALUE: 22
PIF_VALUE: 18
PIF_VALUE: 20
PIF_VALUE: 22
PIF_VALUE: 22
PIF_VALUE: 20
PIF_VALUE: 18
PIF_VALUE: 19
PIF_VALUE: 20
PIF_VALUE: 22
PIF_VALUE: 21
PIF_VALUE: 20

## 2019-03-13 ASSESSMENT — PAIN - FUNCTIONAL ASSESSMENT: PAIN_FUNCTIONAL_ASSESSMENT: 0-10

## 2019-03-13 ASSESSMENT — PAIN SCALES - GENERAL
PAINLEVEL_OUTOF10: 0
PAINLEVEL_OUTOF10: 0

## 2019-03-13 ASSESSMENT — LIFESTYLE VARIABLES: SMOKING_STATUS: 0

## 2019-03-20 VITALS — WEIGHT: 195 LBS | BODY MASS INDEX: 33.73 KG/M2

## 2019-03-21 ENCOUNTER — OFFICE VISIT (OUTPATIENT)
Dept: ORTHOPEDIC SURGERY | Age: 54
End: 2019-03-21

## 2019-03-21 VITALS — HEIGHT: 64 IN | WEIGHT: 199 LBS | BODY MASS INDEX: 33.97 KG/M2

## 2019-03-21 DIAGNOSIS — Z98.890 S/P RIGHT ROTATOR CUFF REPAIR: Primary | ICD-10-CM

## 2019-03-21 PROCEDURE — 99024 POSTOP FOLLOW-UP VISIT: CPT | Performed by: ORTHOPAEDIC SURGERY

## 2019-03-26 ENCOUNTER — HOSPITAL ENCOUNTER (OUTPATIENT)
Dept: PHYSICAL THERAPY | Age: 54
Setting detail: THERAPIES SERIES
Discharge: HOME | End: 2019-03-26
Payer: COMMERCIAL

## 2019-03-26 PROCEDURE — 97110 THERAPEUTIC EXERCISES: CPT

## 2019-03-26 PROCEDURE — 97162 PT EVAL MOD COMPLEX 30 MIN: CPT

## 2019-03-26 PROCEDURE — 97140 MANUAL THERAPY 1/> REGIONS: CPT

## 2019-03-26 ASSESSMENT — PAIN - FUNCTIONAL ASSESSMENT: PAIN_FUNCTIONAL_ASSESSMENT: PREVENTS OR INTERFERES SOME ACTIVE ACTIVITIES AND ADLS

## 2019-03-26 ASSESSMENT — PAIN DESCRIPTION - PROGRESSION: CLINICAL_PROGRESSION: GRADUALLY IMPROVING

## 2019-03-26 ASSESSMENT — PAIN DESCRIPTION - ORIENTATION: ORIENTATION: RIGHT

## 2019-03-26 ASSESSMENT — PAIN DESCRIPTION - LOCATION: LOCATION: SHOULDER

## 2019-03-26 ASSESSMENT — PAIN DESCRIPTION - PAIN TYPE: TYPE: SURGICAL PAIN

## 2019-03-26 ASSESSMENT — PAIN DESCRIPTION - FREQUENCY: FREQUENCY: CONTINUOUS

## 2019-03-26 ASSESSMENT — PAIN SCALES - GENERAL: PAINLEVEL_OUTOF10: 5

## 2019-03-26 ASSESSMENT — PAIN DESCRIPTION - ONSET: ONSET: ON-GOING

## 2019-03-28 ENCOUNTER — HOSPITAL ENCOUNTER (OUTPATIENT)
Dept: PHYSICAL THERAPY | Age: 54
Setting detail: THERAPIES SERIES
Discharge: HOME | End: 2019-03-28
Payer: COMMERCIAL

## 2019-03-28 PROCEDURE — 97110 THERAPEUTIC EXERCISES: CPT

## 2019-03-28 PROCEDURE — 97140 MANUAL THERAPY 1/> REGIONS: CPT

## 2019-04-02 ENCOUNTER — HOSPITAL ENCOUNTER (OUTPATIENT)
Dept: PHYSICAL THERAPY | Age: 54
Setting detail: THERAPIES SERIES
Discharge: HOME | End: 2019-04-02
Payer: COMMERCIAL

## 2019-04-02 PROCEDURE — 97110 THERAPEUTIC EXERCISES: CPT

## 2019-04-02 PROCEDURE — 97140 MANUAL THERAPY 1/> REGIONS: CPT

## 2019-04-02 NOTE — FLOWSHEET NOTE
Physical Therapy Daily Treatment Note  Date:  2019    Patient Name:  Dom Conte    :  1965  MRN: [id number]  Restrictions/Precautions: Follow protocol  Pertinent Medical History:  Medical/Treatment Diagnosis Information:  · Diagnosis: S/P Right RTC Repair  · Treatment Diagnosis: decreased ability to use arm for adl's and work  Insurance/Certification information:  PT Insurance Information: Medical [place]  Physician Information:  Referring Practitioner: Dr. Vanessa Wagner of care signed (Y/N):  routed  Visit# / total visits: 3 /12  Pain level: 6/10            Progress Note: []  Yes  []  No  Next due by: Visit #10      History of Injury: Pt is a 45 y/o female with a hx of right cerv and shoulder pain since . She had some rx of her neck but it did not resolve her pain. She has an  mri of her shouldr and found a rtc tear. She had an RTC, DAD dce, biceps and labral debridement on 3/13/19. She c/o of constant aching pain which is worse at times. She is in a sling part time. MD said she could get out of the sling at home. She is waking from pain on occasion. 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 function and work        Subjective:  Pt states, \" Doing well overall \"   3/28/19  Pt states, \" doing well, not much pain \"  19  Pt states, \" Aching a littlle more \"    Objective:  Initial- prom- flex- 40, abd- 30, ir-30, er- 10, ext- 5      Exercises:  Exercise/Equipment Resistance/Repetitions Other comments   prom All ranges DOS  3/13/19   Sl scap retraction X 2 min    scap retraction X 30 4 wks   4/10/19   Table slides X 2 min ea    scap retraction arms at side X 20 6 wks  19                            HEP     Codmans all motions X 5 min    scap retraction X 30    Table flex slide X 3 min     45 degree slide X 3 min    shrugs X 2 min                     Other Therapeutic Activities:      Home Exercise Program:  Patient demonstrated proper technique, good tolerance,  and was given written instructions for the above exercises      Manual Treatments:  Gentle prom, mfr to entire shoulder, scap, traps, levator, , prom all direction min pain x 20 min    Modalities:  Cp x 10    Timed Code Treatment Minutes:  32    Total Treatment Minutes:  45    Assessment  [x] Patient tolerated treatment well [] Patient limited by fatigue  [] Patient limited by pain  [] Patient limited by other medical complications  [] Other:         Prognosis: [x] Good [] Fair  [] Poor    Patient Requires Follow-up: [x] Yes  [] No    Plan:   [] Continue per plan of care [] Alter current plan (see comments)  [x] Plan of care initiated [] Hold pending MD visit [] Discharge  Plan for Next Session:   Right UE arom, aarom, prom, strengthening, scapular strength, myofascial release, joint mobs to gh, sc, ac, scapular thoracic, modalities for pain,Follow protocol for rtc repair  hep      Electronically signed by:  Amy Arnold PT

## 2019-04-04 ENCOUNTER — HOSPITAL ENCOUNTER (OUTPATIENT)
Dept: PHYSICAL THERAPY | Age: 54
Setting detail: THERAPIES SERIES
Discharge: HOME | End: 2019-04-04
Payer: COMMERCIAL

## 2019-04-04 PROCEDURE — 97110 THERAPEUTIC EXERCISES: CPT

## 2019-04-04 PROCEDURE — 97140 MANUAL THERAPY 1/> REGIONS: CPT

## 2019-04-04 NOTE — FLOWSHEET NOTE
Physical Therapy Daily Treatment Note  Date:  2019    Patient Name:  Loreta Pollock    :  1965  MRN: [id number]  Restrictions/Precautions: Follow protocol  Pertinent Medical History:  Medical/Treatment Diagnosis Information:  · Diagnosis: S/P Right RTC Repair  · Treatment Diagnosis: decreased ability to use arm for adl's and work  Insurance/Certification information:  PT Insurance Information: Medical [place]  Physician Information:  Referring Practitioner: Dr. Lindo Keyanna of care signed (Y/N):  routed  Visit# / total visits:   Pain level: 6/10            Progress Note: []  Yes  []  No  Next due by: Visit #10      History of Injury: Pt is a 47 y/o female with a hx of right cerv and shoulder pain since . She had some rx of her neck but it did not resolve her pain. She has an  mri of her shouldr and found a rtc tear. She had an RTC, DAD dce, biceps and labral debridement on 3/13/19. She c/o of constant aching pain which is worse at times. She is in a sling part time. MD said she could get out of the sling at home. She is waking from pain on occasion. 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 function and work        Subjective:  Pt states, \" Doing well overall \"   3/28/19  Pt states, \" doing well, not much pain \"  19  Pt states, \" Aching a littlle more \"  19  Pt states, \" a little achy today \"    Objective:  Initial- prom- flex- 40, abd- 30, ir-30, er- 10, ext- 5    19  Flex-120, abd-90, ir-50, er- 50    Exercises:  Exercise/Equipment Resistance/Repetitions Other comments   prom All ranges DOS  3/13/19   Sl scap retraction X 2 min    scap retraction X 30 4 wks   4/10/19   Table slides X 2 min ea    scap retraction arms at side X 20 6 wks  19   Very gentle iso X 20                        HEP     Codmans all motions X 5 min    scap retraction X 30    Table flex slide X 3 min     45 degree slide X 3 min    shrugs X 2 min Other Therapeutic Activities:      Home Exercise Program:  Patient demonstrated proper technique, good tolerance,  and was given written instructions for the above exercises      Manual Treatments:  Gentle prom, mfr to entire shoulder, scap, traps, levator, , prom all direction min pain x 20 min    Modalities:  Cp x 10    Timed Code Treatment Minutes:  32    Total Treatment Minutes:  45    Assessment  [x] Patient tolerated treatment well [] Patient limited by fatigue  [] Patient limited by pain  [] Patient limited by other medical complications  [x] Other:   Progressing well, was popping more this am with prom    Prognosis: [x] Good [] Fair  [] Poor    Patient Requires Follow-up: [x] Yes  [] No    Plan:   [] Continue per plan of care [] Alter current plan (see comments)  [x] Plan of care initiated [] Hold pending MD visit [] Discharge  Plan for Next Session:   Right UE arom, aarom, prom, strengthening, scapular strength, myofascial release, joint mobs to gh, sc, ac, scapular thoracic, modalities for pain,Follow protocol for rtc repair  hep      Electronically signed by:  Miesha Vernon PT

## 2019-04-09 ENCOUNTER — HOSPITAL ENCOUNTER (OUTPATIENT)
Dept: PHYSICAL THERAPY | Age: 54
Setting detail: THERAPIES SERIES
Discharge: HOME | End: 2019-04-09
Payer: COMMERCIAL

## 2019-04-09 PROCEDURE — 97110 THERAPEUTIC EXERCISES: CPT

## 2019-04-09 PROCEDURE — 97140 MANUAL THERAPY 1/> REGIONS: CPT

## 2019-04-09 NOTE — FLOWSHEET NOTE
Physical Therapy Daily Treatment Note  Date:  2019    Patient Name:  Nick Hays    :  1965  MRN: [id number]  Restrictions/Precautions: Follow protocol  Pertinent Medical History:  Medical/Treatment Diagnosis Information:  · Diagnosis: S/P Right RTC Repair  · Treatment Diagnosis: decreased ability to use arm for adl's and work  Insurance/Certification information:  PT Insurance Information: Medical [place]  Physician Information:  Referring Practitioner: Dr. Ankur Crespo of care signed (Y/N):  routed  Visit# / total visits:   Pain level: 6/10            Progress Note: []  Yes  []  No  Next due by: Visit #10      History of Injury: Pt is a 45 y/o female with a hx of right cerv and shoulder pain since . She had some rx of her neck but it did not resolve her pain. She has an  mri of her shouldr and found a rtc tear. She had an RTC, DAD dce, biceps and labral debridement on 3/13/19. She c/o of constant aching pain which is worse at times. She is in a sling part time. MD said she could get out of the sling at home. She is waking from pain on occasion. 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 function and work        Subjective:  Pt states, \" Doing well overall \"   3/28/19  Pt states, \" doing well, not much pain \"  19  Pt states, \" Aching a littlle more \"  19  Pt states, \" a little achy today \"   19  Pt states, \" doing better \"    Objective:  Initial- prom- flex- 40, abd- 30, ir-30, er- 10, ext- 5    19  Flex-120, abd-90, ir-50, er- 50    Exercises:  Exercise/Equipment Resistance/Repetitions Other comments   Overhead pulleys X 3 min    Swiss BALL X 4 MIN PAIN Free range    prom All ranges DOS  3/13/19   Cane press X 20    Cane flex X 20    Sl scap retraction X 2 min    scap retraction X 30 4 wks   4/10/19   Table slides X 2 min ea    scap retraction arms at side X 20 6 wks  19   Very gentle iso X 20    Gentle behind back stretch 5 x 10    Overhead pulleys               HEP     Codmans all motions X 5 min    scap retraction X 30    Table flex slide X 3 min     45 degree slide X 3 min    shrugs X 2 min                     Other Therapeutic Activities:      Home Exercise Program:  Patient demonstrated proper technique, good tolerance,  and was given written instructions for the above exercises      Manual Treatments:  Gentle prom, mfr to entire shoulder, scap, traps, levator, , prom all direction min pain x 20 min    Modalities:  Cp x 10    Timed Code Treatment Minutes: 43    Total Treatment Minutes:  55    Assessment  [x] Patient tolerated treatment well [] Patient limited by fatigue  [] Patient limited by pain  [] Patient limited by other medical complications  [x] Other:   Progressing well, was popping more this am with prom    Prognosis: [x] Good [] Fair  [] Poor    Patient Requires Follow-up: [x] Yes  [] No    Plan:   [] Continue per plan of care [] Alter current plan (see comments)  [x] Plan of care initiated [] Hold pending MD visit [] Discharge  Plan for Next Session:   Right UE arom, aarom, prom, strengthening, scapular strength, myofascial release, joint mobs to gh, sc, ac, scapular thoracic, modalities for pain,Follow protocol for rtc repair  hep 4/9/19  Started gentle painfree aarom today      Electronically signed by:  Tamar Delvalle, PT

## 2019-04-11 ENCOUNTER — HOSPITAL ENCOUNTER (OUTPATIENT)
Dept: PHYSICAL THERAPY | Age: 54
Setting detail: THERAPIES SERIES
Discharge: HOME | End: 2019-04-11
Payer: COMMERCIAL

## 2019-04-11 PROCEDURE — 97140 MANUAL THERAPY 1/> REGIONS: CPT

## 2019-04-11 PROCEDURE — 97110 THERAPEUTIC EXERCISES: CPT

## 2019-04-11 NOTE — FLOWSHEET NOTE
Physical Therapy Daily Treatment Note  Date:  2019    Patient Name:  Khloe Villarreal    :  1965  MRN: [id number]  Restrictions/Precautions: Follow protocol  Pertinent Medical History:  Medical/Treatment Diagnosis Information:  · Diagnosis: S/P Right RTC Repair  · Treatment Diagnosis: decreased ability to use arm for adl's and work  Insurance/Certification information:  PT Insurance Information: Medical [place]  Physician Information:  Referring Practitioner: Dr. Emiliano Carrillo of care signed (Y/N):  routed  Visit# / total visits:   Pain level: 6/10            Progress Note: []  Yes  []  No  Next due by: Visit #10      History of Injury: Pt is a 45 y/o female with a hx of right cerv and shoulder pain since . She had some rx of her neck but it did not resolve her pain. She has an  mri of her shouldr and found a rtc tear. She had an RTC, DAD dce, biceps and labral debridement on 3/13/19. She c/o of constant aching pain which is worse at times. She is in a sling part time. MD said she could get out of the sling at home. She is waking from pain on occasion. 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 function and work        Subjective:  Pt states, \" Doing well overall \"   3/28/19  Pt states, \" doing well, not much pain \"  19  Pt states, \" Aching a littlle more \"  19  Pt states, \" a little achy today \"   19  Pt states, \" doing better \"   19  Pt states, \" a little sore in the muscles \"    Objective:  Initial- prom- flex- 40, abd- 30, ir-30, er- 10, ext- 5    19  Flex-120, abd-90, ir-50, er- 50    Exercises:  Exercise/Equipment Resistance/Repetitions Other comments   Nu step      Overhead pulleys X 3 min  gentle    Swiss BALL X 4 MIN PAIN Free range    prom All ranges DOS  3/13/19   Cane press X 20    Cane flex X 20    Sl er to 10 degrees X 20    Sl scap retraction X 2 min    scap retraction X 30 4 wks   4/10/19   Prone scap

## 2019-04-16 ENCOUNTER — APPOINTMENT (OUTPATIENT)
Dept: PHYSICAL THERAPY | Age: 54
End: 2019-04-16
Payer: COMMERCIAL

## 2019-04-18 ENCOUNTER — HOSPITAL ENCOUNTER (OUTPATIENT)
Dept: PHYSICAL THERAPY | Age: 54
Setting detail: THERAPIES SERIES
Discharge: HOME | End: 2019-04-18
Payer: COMMERCIAL

## 2019-04-18 PROCEDURE — 97140 MANUAL THERAPY 1/> REGIONS: CPT

## 2019-04-18 PROCEDURE — 97110 THERAPEUTIC EXERCISES: CPT

## 2019-04-18 NOTE — FLOWSHEET NOTE
Physical Therapy Daily Treatment Note  Date:  2019    Patient Name:  Connie Murray    :  1965  MRN: [id number]  Restrictions/Precautions: Follow protocol  Pertinent Medical History:  Medical/Treatment Diagnosis Information:  · Diagnosis: S/P Right RTC Repair  · Treatment Diagnosis: decreased ability to use arm for adl's and work  Insurance/Certification information:  PT Insurance Information: Medical [place]  Physician Information:  Referring Practitioner: Dr. Philip Dahlign of care signed (Y/N):  routed  Visit# / total visits:   Pain level: 2/10            Progress Note: []  Yes  []  No  Next due by: Visit #10      History of Injury: Pt is a 45 y/o female with a hx of right cerv and shoulder pain since . She had some rx of her neck but it did not resolve her pain. She has an  mri of her shouldr and found a rtc tear. She had an RTC, DAD dce, biceps and labral debridement on 3/13/19. She c/o of constant aching pain which is worse at times. She is in a sling part time. MD said she could get out of the sling at home. She is waking from pain on occasion. 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 \"    Objective:  Initial- prom- flex- 40, abd- 30, ir-30, er- 10, ext- 5    19  Flex-120, abd-90, ir-50, er- 50    Exercises:  Exercise/Equipment Resistance/Repetitions Other comments   Nu step      Overhead pulleys X 4 min  gentle    Swiss BALL X 4 MIN PAIN Free range    prom All ranges DOS  3/13/19   Cane press X 20    Cane flex X 20    Sl er to 10 degrees X 20    Sl scap retraction X 2 min    scap retraction X 30

## 2019-04-23 ENCOUNTER — APPOINTMENT (OUTPATIENT)
Dept: PHYSICAL THERAPY | Age: 54
End: 2019-04-23
Payer: COMMERCIAL

## 2019-04-25 ENCOUNTER — HOSPITAL ENCOUNTER (OUTPATIENT)
Dept: PHYSICAL THERAPY | Age: 54
Setting detail: THERAPIES SERIES
Discharge: HOME | End: 2019-04-25
Payer: COMMERCIAL

## 2019-04-25 PROCEDURE — 97110 THERAPEUTIC EXERCISES: CPT

## 2019-04-25 PROCEDURE — 97140 MANUAL THERAPY 1/> REGIONS: CPT

## 2019-04-25 NOTE — FLOWSHEET NOTE
Physical Therapy Daily Treatment Note  Date:  2019    Patient Name:  Dickson Chi    :  1965  MRN: [id number]  Restrictions/Precautions: Follow protocol  Pertinent Medical History:  Medical/Treatment Diagnosis Information:  · Diagnosis: S/P Right RTC Repair  · Treatment Diagnosis: decreased ability to use arm for adl's and work  Insurance/Certification information:  PT Insurance Information: Medical [place]  Physician Information:  Referring Practitioner: Dr. Stella Castillo of care signed (Y/N):  routed  Visit# / total visits: 812  Pain level: 2/10            Progress Note: []  Yes  []  No  Next due by: Visit #10      History of Injury: Pt is a 45 y/o female with a hx of right cerv and shoulder pain since . She had some rx of her neck but it did not resolve her pain. She has an  mri of her shouldr and found a rtc tear. She had an RTC, DAD dce, biceps and labral debridement on 3/13/19. She c/o of constant aching pain which is worse at times. She is in a sling part time. MD said she could get out of the sling at home. She is waking from pain on occasion. 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 \"  19  Pt states, \"    Objective:  Initial- prom- flex- 40, abd- 30, ir-30, er- 10, ext- 5    19  Flex-120, abd-90, ir-50, er- 50    Exercises:  Exercise/Equipment Resistance/Repetitions Other comments   Nu step  L2 x 30    Overhead pulleys X 3 min  gentle       prom All ranges DOS  3/13/19   Supine flex X 20    Sl abd to 30 deg X 20    Sl er  X 20       scap retraction X 30 4 wks   4/10/19   Prone scap retraction arm at side X 30    6 wks  4/24/19   Very gentle iso X 20 Into abd and flexion   Gentle behind back stretch 5 x 10    Add/ext Green x 30    Wall slide X 20         HEP     Codmans all motions X 5 min    scap retraction X 30    Table flex slide X 3 min     45 degree slide X 3 min    shrugs X 2 min                     Other Therapeutic Activities:      Home Exercise Program:  Patient demonstrated proper technique, good tolerance,  and was given written instructions for the above exercises      Manual Treatments:  Gentle prom, mfr to entire shoulder, scap, traps, levator, , prom all direction min pain x 15 min    Modalities:  Cp x 10    Timed Code Treatment Minutes: 43    Total Treatment Minutes:  54    Assessment  [x] Patient tolerated treatment well [] Patient limited by fatigue  [] Patient limited by pain  [] Patient limited by other medical complications  [x] Other:   Progressing well, was popping more this am with prom    Prognosis: [x] Good [] Fair  [] Poor    Patient Requires Follow-up: [x] Yes  [] No    Plan:   [x] Continue per plan of care [] Alter current plan (see comments)  [] Plan of care initiated [] Hold pending MD visit [] Discharge  Plan for Next Session:   Right UE arom, aarom, prom, strengthening, scapular strength, myofascial release, joint mobs to gh, sc, ac, scapular thoracic, modalities for pain,Follow protocol for rtc repair  [facility] 4/9/19  Started gentle painfree aarom today, to see md on Mon,  Progress per md      Electronically signed by:  Mehreen Benito, PT

## 2019-04-29 ENCOUNTER — OFFICE VISIT (OUTPATIENT)
Dept: ORTHOPEDIC SURGERY | Age: 54
End: 2019-04-29

## 2019-04-29 VITALS — WEIGHT: 200 LBS | BODY MASS INDEX: 34.15 KG/M2 | HEIGHT: 64 IN

## 2019-04-29 DIAGNOSIS — Z98.890 S/P RIGHT ROTATOR CUFF REPAIR: Primary | ICD-10-CM

## 2019-04-29 PROCEDURE — 99024 POSTOP FOLLOW-UP VISIT: CPT | Performed by: ORTHOPAEDIC SURGERY

## 2019-04-29 NOTE — PROGRESS NOTES
Khloe Villarreal  [id number]  April 29, 2019    Chief Complaint   Patient presents with   [facility] Post-Op Check     Right shoulder arthroscopy, subacromial decompression and natalie and rotator cuff repair; DOS 3/13/19. History: The patient is a 58-year-old female admitted for follow-up evaluation regarding her right shoulder arthroscopy with rotator cuff repair completed on 3/13/19. She reports little to no pain today. She is making significant progress. The patient's  past medical history, medications, allergies,  family history, social history, and review of systems have been reviewed, and dated and are recorded in the chart. Ht 5' 3.75\" (1.619 m)   Wt 200 lb (90.7 kg)   BMI 34.60 kg/m²     Physical:  Ms. Khloe Villarreal appears well, she is in no apparent distress, she demonstrates appropriate mood & affect. She is alert and oriented to person, place and time. She has moderate swelling. She is neurovascularly intact distally. Sensation is intact in the axillary nerve distribution. right shoulder range of motion: 155 degrees abduction, 160 degrees forward flexion, 35 degrees external rotation. She has minimal pain with light range of motion of the shoulder. The incisions are clean, dry, intact and nontender and without erythema. Strength in the shoulder was 4/5. Impression: status post right shoulder arthroscopy, subacromial decompression and natalie and rotator cuff repair    Plan: At this time, the patient will work aggressively on range of motion and strengthening. She was encouraged to modify her activities. She will avoid heavy lifting pushing and pulling. 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

## 2019-04-29 NOTE — LETTER
[facility]  [address]  Phone: [phone]  Fax: [phone]    Inge Anderson MD        April 29, 2019     Patient: Kayla Aponte   YOB: 1965   Date of Visit: 4/29/2019       To Whom It May Concern: It is my medical opinion that Kayla Aponte may return to work on 5/8/19 with the following restrictions: no overhead pushing, pulling, lifting , 25 lb weight restrictions. If you have any questions or concerns, please don't hesitate to call.     Sincerely,          Inge Anderson MD

## 2019-05-01 ENCOUNTER — TELEPHONE (OUTPATIENT)
Dept: ORTHOPEDIC SURGERY | Age: 54
End: 2019-05-01

## 2019-05-01 ENCOUNTER — HOSPITAL ENCOUNTER (OUTPATIENT)
Dept: PHYSICAL THERAPY | Age: 54
Setting detail: THERAPIES SERIES
Discharge: HOME | End: 2019-05-01
Payer: COMMERCIAL

## 2019-05-01 PROCEDURE — 97140 MANUAL THERAPY 1/> REGIONS: CPT

## 2019-05-01 PROCEDURE — 97110 THERAPEUTIC EXERCISES: CPT

## 2019-05-01 NOTE — FLOWSHEET NOTE
Physical Therapy Daily Treatment Note  Date:  2019    Patient Name:  Garland Miller    :  1965  MRN: [id number]  Restrictions/Precautions: Follow protocol  Pertinent Medical History:  Medical/Treatment Diagnosis Information:  · Diagnosis: S/P Right RTC Repair  · Treatment Diagnosis: decreased ability to use arm for adl's and work  Insurance/Certification information:  PT Insurance Information: Medical [place]  Physician Information:  Referring Practitioner: Dr. Manuel Been of care signed (Y/N):  routed  Visit# / total visits:  Pain level: 2/10            Progress Note: []  Yes  []  No  Next due by: Visit #10      History of Injury: Pt is a 45 y/o female with a hx of right cerv and shoulder pain since . She had some rx of her neck but it did not resolve her pain. She has an  mri of her shouldr and found a rtc tear. She had an RTC, DAD dce, biceps and labral debridement on 3/13/19. She c/o of constant aching pain which is worse at times. She is in a sling part time. MD said she could get out of the sling at home. She is waking from pain on occasion. 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 \"  19  Pt states, \" md says I can go back to work next week \"    Objective:  Initial- prom- flex- 40, abd- 30, ir-30, er- 10, ext- 5    19  Flex-120, abd-90, ir-50, er- 50    Exercises:  Exercise/Equipment Resistance/Repetitions Other comments   Nu step  L2 x 30          prom All ranges DOS  3/13/19   Supine flex X 20    scaption 1# x 30    Sl er  X1#  30         scap retraction X 30 4 wks   4/10/19   Prone scap retraction arm at side X 30      6 wks  4/24/19    isos towards end range X 20 Into abd and flexion   Gentle behind back stretch 5 x 10    Add/ext Green x 30    Wall slide X 20    Ir/er 1# x 30                        HEP     Codmans all motions X 5 min    scap retraction X 30    Table flex slide X 3 min     45 degree slide X 3 min    shrugs X 2 min    Ir/er yelllow x 30                Other Therapeutic Activities:      Home Exercise Program:  Patient demonstrated proper technique, good tolerance,  and was given written instructions for the above exercises      Manual Treatments:  Gentle prom, mfr to entire shoulder, scap, traps, levator, , prom all direction min pain x 15 min    Modalities:  Cp x 10    Timed Code Treatment Minutes: 43    Total Treatment Minutes:  54    Assessment  [x] Patient tolerated treatment well [] Patient limited by fatigue  [] Patient limited by pain  [] Patient limited by other medical complications  [x] Other:   Progressing well, was popping more this am with prom    Prognosis: [x] Good [] Fair  [] Poor    Patient Requires Follow-up: [x] Yes  [] No    Plan:   [x] Continue per plan of care [] Alter current plan (see comments)  [] Plan of care initiated [] Hold pending MD visit [] Discharge  Plan for Next Session:   Right UE arom, aarom, prom, strengthening, scapular strength, myofascial release, joint mobs to gh, sc, ac, scapular thoracic, modalities for pain,Follow protocol for rtc repair  hep 4/9/19  Started gentle painfree aarom today, to see md on Mon,  Progress per md, 5/1/19  md says to aggressively strengthen      Electronically signed by:  Lauryn Reddy, PT

## 2019-05-01 NOTE — LETTER
[facility]  [address] [facility] [address] [place], [place] [address]  Phone: [phone]  Fax: [phone]    Tito Flores MD        May 1, 2019     Patient: Ramiro Sherwood   YOB: 1965   Date of Visit: 5/1/2019       To Whom It May Concern: It is my medical opinion that Ramiro Sherwood may return to work on 5/8/19 with the following restrictions: no overhead pushing, pulling, lifting , 10 lb weight restrictions. If you have any questions or concerns, please don't hesitate to call.     Sincerely,          Tito Flores MD

## 2019-05-01 NOTE — TELEPHONE ENCOUNTER
Per Dr. Danielle Keenan we can change her note to state 10 lb weight restriction. Patient informed. New note written.  She will pick this up at the  at [facility].

## 2019-05-03 ENCOUNTER — TELEPHONE (OUTPATIENT)
Dept: ORTHOPEDIC SURGERY | Age: 54
End: 2019-05-03

## 2019-05-03 ENCOUNTER — HOSPITAL ENCOUNTER (OUTPATIENT)
Dept: PHYSICAL THERAPY | Age: 54
Setting detail: THERAPIES SERIES
Discharge: HOME | End: 2019-05-03
Payer: COMMERCIAL

## 2019-05-03 PROCEDURE — 97110 THERAPEUTIC EXERCISES: CPT

## 2019-05-03 PROCEDURE — 97140 MANUAL THERAPY 1/> REGIONS: CPT

## 2019-05-03 NOTE — TELEPHONE ENCOUNTER
Called and told her that it is up to the employer to find something for her to do. She will talk to her HR person on Monday and call back if needed.

## 2019-05-03 NOTE — TELEPHONE ENCOUNTER
Please call patient to discuss new work notes. Stated her employer said she cannot come back to work with restrictions.

## 2019-05-03 NOTE — FLOWSHEET NOTE
Physical Therapy Daily Treatment Note  Date:  5/3/2019    Patient Name:  Hussain Evans    :  1965  MRN: [id number]  Restrictions/Precautions: Follow protocol  Pertinent Medical History:  Medical/Treatment Diagnosis Information:  · Diagnosis: S/P Right RTC Repair  · Treatment Diagnosis: decreased ability to use arm for adl's and work  Insurance/Certification information:  PT Insurance Information: Medical [place]  Physician Information:  Referring Practitioner: Dr. Ramiro Gupta of care signed (Y/N):  routed  Visit# / total visits:10/12  Pain level: 2/10            Progress Note: []  Yes  []  No  Next due by: Visit #10      History of Injury: Pt is a 47 y/o female with a hx of right cerv and shoulder pain since . She had some rx of her neck but it did not resolve her pain. She has an  mri of her shouldr and found a rtc tear. She had an RTC, DAD dce, biceps and labral debridement on 3/13/19. She c/o of constant aching pain which is worse at times. She is in a sling part time. MD said she could get out of the sling at home. She is waking from pain on occasion. 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 \"  19  Pt states, \" md says I can go back to work next week \"  5/3/19  Pt states, \" improving, using it a little more \"    Objective:  Initial- prom- flex- 40, abd- 30, ir-30, er- 10, ext- 5    19  Flex-120, abd-90, ir-50, er- 50    Exercises:  Exercise/Equipment Resistance/Repetitions Other comments   Nu step  L2 x 30          prom All ranges DOS  3/13/19   Supine flex X 20    scaption 1# x 30    Sl er  X2#  30

## 2019-05-06 ENCOUNTER — TELEPHONE (OUTPATIENT)
Dept: ORTHOPEDIC SURGERY | Age: 54
End: 2019-05-06

## 2019-05-06 NOTE — TELEPHONE ENCOUNTER
I spoke with the patient. She is unable to return to work with the restrictions provided. She is requesting an off work note to go until est 5/22/19. She would like to know when Dr. Juanito Sherman feels she could return without restrictions. She is aware that this will be addressed Wednesday. Off work note was faxed to [employer].

## 2019-05-06 NOTE — LETTER
[facility]  [address] [facility] [address]  Phone: [phone]  Fax: [phone]    Davi Mora MD        May 9, 2019     Patient: Sabino Richards   YOB: 1965   Date of Visit: 5/6/2019       To Whom It May Concern: It is my medical opinion that Sabino Richards should remain out of work until 5/22/19. She may then return to work full duty with no restrictions. If you have any questions or concerns, please don't hesitate to call.     Sincerely,          Davi Mora MD

## 2019-05-06 NOTE — LETTER
[facility]  [address] [place]  Phone: [phone]  Fax: [phone]    Devin Dewey MD        May 6, 2019     Patient: Kymberly Haynes   YOB: 1965   Date of Visit: 5/6/2019       To Whom It May Concern: It is my medical opinion that Kymberly Haynes should remain out of work until 5/22/19, estimated. If you have any questions or concerns, please don't hesitate to call.     Sincerely,          Devin Dewey MD

## 2019-05-07 ENCOUNTER — HOSPITAL ENCOUNTER (OUTPATIENT)
Dept: PHYSICAL THERAPY | Age: 54
Setting detail: THERAPIES SERIES
Discharge: HOME | End: 2019-05-07
Payer: COMMERCIAL

## 2019-05-07 PROCEDURE — 97110 THERAPEUTIC EXERCISES: CPT

## 2019-05-07 PROCEDURE — 97140 MANUAL THERAPY 1/> REGIONS: CPT

## 2019-05-07 NOTE — FLOWSHEET NOTE
Physical Therapy Daily Treatment Note  Date:  2019    Patient Name:  Luis Bo    :  1965  MRN: [id number]  Restrictions/Precautions: Follow protocol  Pertinent Medical History:  Medical/Treatment Diagnosis Information:  · Diagnosis: S/P Right RTC Repair  · Treatment Diagnosis: decreased ability to use arm for adl's and work  Insurance/Certification information:  PT Insurance Information: Medical [place]  Physician Information:  Referring Practitioner: Dr. Марина Bro of care signed (Y/N):  routed  Visit# / total visits:  Pain level: 2/10            Progress Note: []  Yes  []  No  Next due by: Visit #10      History of Injury: Pt is a 45 y/o female with a hx of right cerv and shoulder pain since . She had some rx of her neck but it did not resolve her pain. She has an  mri of her shouldr and found a rtc tear. She had an RTC, DAD dce, biceps and labral debridement on 3/13/19. She c/o of constant aching pain which is worse at times. She is in a sling part time. MD said she could get out of the sling at home. She is waking from pain on occasion. 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 \"  19  Pt states, \" md says I can go back to work next week \"  5/3/19  Pt states, \" improving, using it a little more \"  19 Patient reports shoulder is feeling better she has been using her arm more.      Objective:  Initial- prom- flex- 40, abd- 30, ir-30, er- 10, ext- 5    19  Flex-120, abd-90, ir-50, er- 50    Exercises:  Exercise/Equipment Resistance/Repetitions Other comments   Nu step  L2 x 6 min

## 2019-05-08 NOTE — TELEPHONE ENCOUNTER
I spoke with Dr. Fauzia Bridges. He is fine with the patient going back to work full duty after 5/22/19. I left a message for the patient to call back to discuss this.

## 2019-05-09 ENCOUNTER — HOSPITAL ENCOUNTER (OUTPATIENT)
Dept: PHYSICAL THERAPY | Age: 54
Setting detail: THERAPIES SERIES
Discharge: HOME | End: 2019-05-09
Payer: COMMERCIAL

## 2019-05-09 PROCEDURE — 97140 MANUAL THERAPY 1/> REGIONS: CPT

## 2019-05-09 PROCEDURE — 97110 THERAPEUTIC EXERCISES: CPT

## 2019-05-09 NOTE — TELEPHONE ENCOUNTER
Patient calling back stating RTW  Date for  5-22-19 is fine and would like it faxed to employee services . The patient would like to  a copy of the letter physical therapy on 5-14-19.

## 2019-05-09 NOTE — FLOWSHEET NOTE
Physical Therapy Daily Treatment Note  Date:  2019    Patient Name:  Dickson Chi    :  1965  MRN: [id number]  Restrictions/Precautions: Follow protocol  Pertinent Medical History:  Medical/Treatment Diagnosis Information:  · Diagnosis: S/P Right RTC Repair  · Treatment Diagnosis: decreased ability to use arm for adl's and work  Insurance/Certification information:  PT Insurance Information: Medical [place]  Physician Information:  Referring Practitioner: Dr. Stella Castillo of care signed (Y/N):  routed  Visit# / total visits:  Pain level: 3/10            Progress Note: []  Yes  []  No  Next due by: Visit #10      History of Injury: Pt is a 45 y/o female with a hx of right cerv and shoulder pain since . She had some rx of her neck but it did not resolve her pain. She has an  mri of her shouldr and found a rtc tear. She had an RTC, DAD dce, biceps and labral debridement on 3/13/19. She c/o of constant aching pain which is worse at times. She is in a sling part time. MD said she could get out of the sling at home. She is waking from pain on occasion. 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 \"  19  Pt states, \" md says I can go back to work next week \"  5/3/19  Pt states, \" improving, using it a little more \"  19 Patient reports shoulder is feeling better she has been using her arm more.    19  Pt states, \" a little more sore from the workout on Tues \"    Objective:  Initial- prom- flex- 40, abd- 30, ir-30, er- 10, ext- 5    19  Flex-120, abd-90, ir-50, er- 50    Exercises:  Exercise/Equipment Resistance/Repetitions Other comments   Nu step  L2 x 6 min          prom All ranges DOS  3/13/19   Supine flex X 20    scaption 1# x 30    Sl er  X2#  30         scap retraction X 30 4 wks   4/10/19   Prone scap retraction arm at side X 30    Standing flex x 30    Rhythmic stab X 1 min at 90         6 wks  4/24/19    isos towards end range X 20 Into abd and flexion   Gentle behind back stretch 5 x 10    Add/ext Green x 30    Wall slide X 20 1 arm ecc down    Ir/er Yellow  x 30    ranger X 20    [place] circles 1# x 30              HEP     Codmans all motions X 5 min    scap retraction X 30    Table flex slide X 3 min     45 degree slide X 3 min    shrugs X 2 min    Ir/er yelllow x 30                Other Therapeutic Activities:      Home Exercise Program:  Patient demonstrated proper technique, good tolerance,  and was given written instructions for the above exercises      Manual Treatments:  Gentle prom, mfr to entire shoulder, scap, traps, levator, , prom all direction min pain x 15 min    Modalities:  Cp x 10    Timed Code Treatment Minutes: 43    Total Treatment Minutes:  54    Assessment  [x] Patient tolerated treatment well [] Patient limited by fatigue  [] Patient limited by pain  [] Patient limited by other medical complications  [x] Other:   Progressing well, was popping more this am with prom    Prognosis: [x] Good [] Fair  [] Poor    Patient Requires Follow-up: [x] Yes  [] No    Plan:   [x] Continue per plan of care [] Alter current plan (see comments)  [] Plan of care initiated [] Hold pending MD visit [] Discharge  Plan for Next Session:   Right UE arom, aarom, prom, strengthening, scapular strength, myofascial release, joint mobs to gh, sc, ac, scapular thoracic, modalities for pain,Follow protocol for rtc repair  hep 4/9/19  Started gentle painfree aarom today, to see md on Mon,  Progress per md, 5/1/19  md says to aggressively strengthen      Electronically signed by:  Marlee Garcia, PT

## 2019-05-14 ENCOUNTER — HOSPITAL ENCOUNTER (OUTPATIENT)
Dept: PHYSICAL THERAPY | Age: 54
Setting detail: THERAPIES SERIES
Discharge: HOME | End: 2019-05-14
Payer: COMMERCIAL

## 2019-05-14 PROCEDURE — 97110 THERAPEUTIC EXERCISES: CPT

## 2019-05-14 PROCEDURE — 97140 MANUAL THERAPY 1/> REGIONS: CPT

## 2019-05-14 NOTE — FLOWSHEET NOTE
Physical Therapy Daily Treatment Note  Date:  2019    Patient Name:  Colleen Thompson    :  1965  MRN: [id number]  Restrictions/Precautions: Follow protocol  Pertinent Medical History:  Medical/Treatment Diagnosis Information:  · Diagnosis: S/P Right RTC Repair  · Treatment Diagnosis: decreased ability to use arm for adl's and work  Insurance/Certification information:  PT Insurance Information: Medical [place]  Physician Information:  Referring Practitioner: Dr. Bonifacio Lantigua of care signed (Y/N):  routed  Visit# / total visits:  Pain level: 3/10            Progress Note: []  Yes  []  No  Next due by: Visit #10      History of Injury: Pt is a 47 y/o female with a hx of right cerv and shoulder pain since . She had some rx of her neck but it did not resolve her pain. She has an  mri of her shouldr and found a rtc tear. She had an RTC, DAD dce, biceps and labral debridement on 3/13/19. She c/o of constant aching pain which is worse at times. She is in a sling part time. MD said she could get out of the sling at home. She is waking from pain on occasion. 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 \"  19  Pt states, \" md says I can go back to work next week \"  5/3/19  Pt states, \" improving, using it a little more \"  19 Patient reports shoulder is feeling better she has been using her arm more.    19  Pt states, \" a little more sore from the workout on Tues \"  19  Pt states, \" Go back to work next week \"    Objective:  Initial- prom- flex- 40, abd- 30, ir-30, er- 10, ext- 5    19  Flex-120, abd-90, ir-50, er- 50    Exercises:  Exercise/Equipment Resistance/Repetitions Other comments   Nu step  L2 x 6 min    Overhead pulleys X 3 min  gentle    Overhead press  x 30    prom All ranges DOS  3/13/19   Supine flex X 20    scaption 1# x 30    Sl er  3#  30    fencing Yellow x 2 min    scap retraction X 30 4 wks   4/10/19   Prone scap retraction arm at side X 30    Standing flex x 30    Rhythmic stab X 1 min at 90         6 wks  4/24/19    isos towards end range X 20 Into abd and flexion   Gentle behind back stretch 5 x 10    Add/ext Green x 30    Wall slide X 20 1 arm ecc down    Ir/er Yellow  x 30    ranger X 20    [place] circles 1# x 30              HEP     Codmans all motions X 5 min    scap retraction X 30    Table flex slide X 3 min     45 degree slide X 3 min    shrugs X 2 min    Ir/er yelllow x 30                Other Therapeutic Activities:      Home Exercise Program:  Patient demonstrated proper technique, good tolerance,  and was given written instructions for the above exercises      Manual Treatments:  Gentle prom, mfr to entire shoulder, scap, traps, levator, , prom all direction min pain x 15 min    Modalities:  Cp x 10    Timed Code Treatment Minutes: 43    Total Treatment Minutes:  54    Assessment  [x] Patient tolerated treatment well [] Patient limited by fatigue  [] Patient limited by pain  [] Patient limited by other medical complications  [x] Other:   Progressing well, was popping more this am with prom    Prognosis: [x] Good [] Fair  [] Poor    Patient Requires Follow-up: [x] Yes  [] No    Plan:   [x] Continue per plan of care [] Alter current plan (see comments)  [] Plan of care initiated [] Hold pending MD visit [] Discharge  Plan for Next Session:   Right UE arom, aarom, prom, strengthening, scapular strength, myofascial release, joint mobs to gh, sc, ac, scapular thoracic, modalities for pain,Follow protocol for rtc repair  hep 4/9/19  Started gentle painfree aarom today, to see md on Mon,  Progress per md, 5/1/19  md says to aggressively strengthen      Electronically signed by:  Amy Arnold, PT

## 2019-05-16 ENCOUNTER — HOSPITAL ENCOUNTER (OUTPATIENT)
Dept: PHYSICAL THERAPY | Age: 54
Setting detail: THERAPIES SERIES
Discharge: HOME | End: 2019-05-16
Payer: COMMERCIAL

## 2019-05-16 PROCEDURE — 97110 THERAPEUTIC EXERCISES: CPT

## 2019-05-16 PROCEDURE — 97140 MANUAL THERAPY 1/> REGIONS: CPT

## 2019-05-16 NOTE — FLOWSHEET NOTE
Physical Therapy Daily Treatment Note  Date:  2019    Patient Name:  Luis Bo    :  1965  MRN: [id number]  Restrictions/Precautions: Follow protocol  Pertinent Medical History:  Medical/Treatment Diagnosis Information:  · Diagnosis: S/P Right RTC Repair  · Treatment Diagnosis: decreased ability to use arm for adl's and work  Insurance/Certification information:  PT Insurance Information: Medical [place]  Physician Information:  Referring Practitioner: Dr. Марина Bro of care signed (Y/N):  routed  Visit# / total visits:  Pain level: 1-3/10            Progress Note: []  Yes  []  No  Next due by: Visit #10      History of Injury: Pt is a 45 y/o female with a hx of right cerv and shoulder pain since . She had some rx of her neck but it did not resolve her pain. She has an  mri of her shouldr and found a rtc tear. She had an RTC, DAD dce, biceps and labral debridement on 3/13/19. She c/o of constant aching pain which is worse at times. She is in a sling part time. MD said she could get out of the sling at home. She is waking from pain on occasion. 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 \"  19  Pt states, \" md says I can go back to work next week \"  5/3/19  Pt states, \" improving, using it a little more \"  19 Patient reports shoulder is feeling better she has been using her arm more.    19  Pt states, \" a little more sore from the workout on Tues \"  19  Pt states, \" Go back to work next week \"  19  Pt states, \" improving \"    Objective:  Initial- prom- flex- 40, abd- 30, ir-30, er- 8, ext- 5    4/4/19  Flex-120, abd-90, ir-50, er- 48    Exercises:  Exercise/Equipment Resistance/Repetitions Other comments   Nu step  L2 x 6 min    Overhead pulleys X 3 min  gentle    Overhead press  x 30    prom All ranges DOS  3/13/19   Supine flex X 20    scaption 1# x 30    Sl er  3#  30    fencing Yellow x 2 min    scap retraction X 30 4 wks   4/10/19   Prone scap retraction arm at side X 30    Standing flex x 30    Rhythmic stab X 1 min at 90       Body blade X 2 min 6 wks  4/24/19    isos towards end range X 20 Into abd and flexion   Gentle behind back stretch 5 x 10    Add/ext Green x 30    Wall slide X 20 1 arm ecc down    Ir/er Yellow  x 30    ranger X 20    [place] circles 1# x 30              HEP     Codmans all motions X 5 min    scap retraction X 30    Table flex slide X 3 min     45 degree slide X 3 min    shrugs X 2 min    Ir/er yelllow x 30                Other Therapeutic Activities:      Home Exercise Program:  Patient demonstrated proper technique, good tolerance,  and was given written instructions for the above exercises      Manual Treatments:  Gentle prom, mfr to entire shoulder, scap, traps, levator, , prom all direction min pain x 15 min    Modalities:  Cp x 10    Timed Code Treatment Minutes: 43    Total Treatment Minutes:  54    Assessment  [x] Patient tolerated treatment well [] Patient limited by fatigue  [] Patient limited by pain  [] Patient limited by other medical complications  [x] Other:   Progressing well, was popping more this am with prom  5/16/19  Improving, moving bettera nd getting stronger     Prognosis: [x] Good [] Fair  [] Poor    Patient Requires Follow-up: [x] Yes  [] No    Plan:   [x] Continue per plan of care [] Alter current plan (see comments)  [] Plan of care initiated [] Hold pending MD visit [] Discharge  Plan for Next Session:   Right UE arom, aarom, prom, strengthening, scapular strength, myofascial release, joint mobs to gh, sc, ac, scapular thoracic, modalities for pain,Follow protocol for rtc repair  hep 4/9/19  Started gentle painfree scott today, to see md on Mon,  Progress per md, 5/1/19  md says to aggressively strengthen      Electronically signed by:  Miesha Vernon, PT

## 2019-05-17 ENCOUNTER — TELEPHONE (OUTPATIENT)
Dept: ORTHOPEDIC SURGERY | Age: 54
End: 2019-05-17

## 2019-05-17 NOTE — TELEPHONE ENCOUNTER
I called Dread Roy from [facility] and left a message to call me. I spoke with Julia Holt and she said she received a note from us stating patient will be off work until 5/22/19. She just wanted to know if patient was going to RTW on 5/22/19 or 5/23/19. I spoke with patient and she will RTW on 5/22/19 full duty no restrictions. Form was faxed to Julia Holt today.

## 2019-05-21 ENCOUNTER — HOSPITAL ENCOUNTER (OUTPATIENT)
Dept: PHYSICAL THERAPY | Age: 54
Setting detail: THERAPIES SERIES
Discharge: HOME | End: 2019-05-21
Payer: COMMERCIAL

## 2019-05-21 PROCEDURE — 97110 THERAPEUTIC EXERCISES: CPT

## 2019-05-21 PROCEDURE — 97140 MANUAL THERAPY 1/> REGIONS: CPT

## 2019-05-21 NOTE — FLOWSHEET NOTE
Physical Therapy Daily Treatment Note  Date:  2019    Patient Name:  Hussain Evans    :  1965  MRN: [id number]  Restrictions/Precautions: Follow protocol  Pertinent Medical History:  Medical/Treatment Diagnosis Information:  · Diagnosis: S/P Right RTC Repair  · Treatment Diagnosis: decreased ability to use arm for adl's and work  Insurance/Certification information:  PT Insurance Information: Medical [place]  Physician Information:  Referring Practitioner: Dr. Phillip Hampton of care signed (Y/N):  routed  Visit# / total visits:15/24  Pain level: -2/10            Progress Note: []  Yes  []  No  Next due by: Visit #10      History of Injury: Pt is a 47 y/o female with a hx of right cerv and shoulder pain since . She had some rx of her neck but it did not resolve her pain. She has an  mri of her shouldr and found a rtc tear. She had an RTC, DAD dce, biceps and labral debridement on 3/13/19. She c/o of constant aching pain which is worse at times. She is in a sling part time. MD said she could get out of the sling at home. She is waking from pain on occasion. 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 \"  19  Pt states, \" md says I can go back to work next week \"  5/3/19  Pt states, \" improving, using it a little more \"  19 Patient reports shoulder is feeling better she has been using her arm more.    19  Pt states, \" a little more sore from the workout on Tues \"  19  Pt states, \" Go back to work next week \"  19  Pt states, \" improving \"  19 Patient reports shoulder is improving,has been using pulleys at home.     Objective:  Initial- prom- flex- 40, abd- 30, ir-30, er- 10, ext- 5    4/4/19  Flex-120, abd-90, ir-50, er- 50    Exercises:  Exercise/Equipment Resistance/Repetitions Other comments   Nu step  L2 x 6 min    Overhead pulleys X 3 min  gentle    Overhead press  x 30    prom All ranges DOS  3/13/19   Supine flex X 20    scaption 1# x 30    Sl er  3#  30    fencing  resume   scap retraction X 30 4 wks   4/10/19   Prone scap retraction arm at side X 30    Standing flex x 30    Rhythmic stab X 1 min at 90       Body blade X 2 min 6 wks  4/24/19    isos towards end range X 20 Into abd and flexion   Gentle behind back stretch 5 x 10    Add/ext Green x 30    Wall slide X 20 1 arm ecc down    Ir/er Yellow  x 30    ranger X 20    [place] circles 1# x 30              HEP     C[place] all motions X 5 min    scap retraction X 30    Table flex slide X 3 min     45 degree slide X 3 min    shrugs X 2 min    Ir/er yelllow x 30                Other Therapeutic Activities:      Home Exercise Program:  Patient demonstrated proper technique, good tolerance,  and was given written instructions for the above exercises      Manual Treatments:  Gentle prom, mfr to entire shoulder, scap, traps, levator, , prom all direction min pain x 15 min    Modalities:  Cp x 10    Timed Code Treatment Minutes: 43    Total Treatment Minutes:  54    Assessment  [x] Patient tolerated treatment well [] Patient limited by fatigue  [] Patient limited by pain  [] Patient limited by other medical complications  [x] Other:   Progressing well, was popping more this am with prom  5/16/19  Improving, moving bettera nd getting stronger     Prognosis: [x] Good [] Fair  [] Poor    Patient Requires Follow-up: [x] Yes  [] No    Plan:   [x] Continue per plan of care [] Alter current plan (see comments)  [] Plan of care initiated [] Hold pending MD visit [] Discharge  Plan for Next Session:   Right UE arom, aarom, prom, strengthening, scapular strength, myofascial release, joint mobs to gh, sc, ac, scapular thoracic, modalities for pain,Follow protocol for rtc repair  hep 4/9/19  Started gentle painfree scott today, to see md on Mon,  Progress per md, 5/1/19  md says to aggressively strengthen      Electronically signed by:  Senait Ovalles, PTA

## 2019-05-23 ENCOUNTER — HOSPITAL ENCOUNTER (OUTPATIENT)
Dept: PHYSICAL THERAPY | Age: 54
Setting detail: THERAPIES SERIES
Discharge: HOME | End: 2019-05-23
Payer: COMMERCIAL

## 2019-05-23 PROCEDURE — 97140 MANUAL THERAPY 1/> REGIONS: CPT

## 2019-05-23 PROCEDURE — 97110 THERAPEUTIC EXERCISES: CPT

## 2019-05-23 NOTE — FLOWSHEET NOTE
Physical Therapy Daily Treatment Note  Date:  2019    Patient Name:  Omero Meyer    :  1965  MRN: [id number]  Restrictions/Precautions: Follow protocol  Pertinent Medical History:  Medical/Treatment Diagnosis Information:  · Diagnosis: S/P Right RTC Repair  · Treatment Diagnosis: decreased ability to use arm for adl's and work  Insurance/Certification information:  PT Insurance Information: Medical [place]  Physician Information:  Referring Practitioner: Dr. Davis Divers of care signed (Y/N):  routed  Visit# / total visits:  Pain level: -2/10            Progress Note: []  Yes  []  No  Next due by: Visit #10      History of Injury: Pt is a 47 y/o female with a hx of right cerv and shoulder pain since . She had some rx of her neck but it did not resolve her pain. She has an  mri of her shouldr and found a rtc tear. She had an RTC, DAD dce, biceps and labral debridement on 3/13/19. She c/o of constant aching pain which is worse at times. She is in a sling part time. MD said she could get out of the sling at home. She is waking from pain on occasion. 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 \"  19  Pt states, \" md says I can go back to work next week \"  5/3/19  Pt states, \" improving, using it a little more \"  19 Patient reports shoulder is feeling better she has been using her arm more.    19  Pt states, \" a little more sore from the workout on Tues \"  19  Pt states, \" Go back to work next week \"  19  Pt states, \" improving \"  19 Patient reports shoulder is improving,has been using pulleys at home. 05/23/19 Patient reports she worked full day yesterday and shoulder felt fine.     Objective:  Initial- prom- flex- 40, abd- 30, ir-30, er- 10, ext- 5    4/4/19  Flex-120, abd-90, ir-50, er- 50    Exercises:  Exercise/Equipment Resistance/Repetitions Other comments   Nu step  L2 x 6 min    Overhead pulleys X 3 min  gentle    Overhead press  x 30    prom All ranges DOS  3/13/19   Supine flex X 20    scaption 1# x 30    Sl er  3#  30    fencing  resume   scap retraction X 30 4 wks   4/10/19   Prone scap retraction arm at side     Standing flex x 30    Rhythmic stab X 1 min at 90       Body blade X 2 min 6 wks  4/24/19    isos towards end range X 20 Into abd and flexion   Gentle behind back stretch 5 x 10    Add/ext Green x 30    Wall slide X 20 1 arm ecc down    Ir/er Yellow  x 30    ranger X 20    [place] circles 1# x 30              HEP     [place] all motions X 5 min    scap retraction X 30    Table flex slide X 3 min     45 degree slide X 3 min    shrugs X 2 min    Ir/er yelllow x 30                Other Therapeutic Activities:      Home Exercise Program:  Patient demonstrated proper technique, good tolerance,  and was given written instructions for the above exercises      Manual Treatments:  Gentle prom, mfr to entire shoulder, scap, traps, levator, , prom all direction min pain x 15 min    Modalities:  Cp x 10    Timed Code Treatment Minutes: 43    Total Treatment Minutes:  54    Assessment  [x] Patient tolerated treatment well [] Patient limited by fatigue  [] Patient limited by pain  [] Patient limited by other medical complications  [x] Other:   Progressing well, was popping more this am with prom  5/16/19  Improving, moving bettera nd getting stronger     Prognosis: [x] Good [] Fair  [] Poor    Patient Requires Follow-up: [x] Yes  [] No    Plan:   [x] Continue per plan of care [] Alter current plan (see comments)  [] Plan of care initiated [] Hold pending MD visit [] Discharge  Plan for Next Session:   Right UE arom, aarom, prom, strengthening, scapular strength, myofascial release, joint mobs to gh, sc, ac, scapular thoracic, modalities for pain,Follow protocol for rtc repair  hep 4/9/19  Started gentle painfree scott today, to see md on Mon,  Progress per md, 5/1/19  md says to aggressively strengthen      Electronically signed by:  Too Rae, PTA

## 2019-05-28 ENCOUNTER — HOSPITAL ENCOUNTER (OUTPATIENT)
Dept: PHYSICAL THERAPY | Age: 54
Setting detail: THERAPIES SERIES
Discharge: HOME | End: 2019-05-28
Payer: COMMERCIAL

## 2019-05-28 PROCEDURE — 97140 MANUAL THERAPY 1/> REGIONS: CPT

## 2019-05-28 PROCEDURE — 97110 THERAPEUTIC EXERCISES: CPT

## 2019-05-28 NOTE — FLOWSHEET NOTE
Physical Therapy Daily Treatment Note  Date:  2019    Patient Name:  Latha Husain    :  1965  MRN: [id number]  Restrictions/Precautions: Follow protocol  Pertinent Medical History:  Medical/Treatment Diagnosis Information:  · Diagnosis: S/P Right RTC Repair  · Treatment Diagnosis: decreased ability to use arm for adl's and work  Insurance/Certification information:  PT Insurance Information: Medical [place]  Physician Information:  Referring Practitioner: Dr. Magy Anderson of care signed (Y/N):  routed  Visit# / total visits:  Pain level: -2/10            Progress Note: []  Yes  []  No  Next due by: Visit #10      History of Injury: Pt is a 45 y/o female with a hx of right cerv and shoulder pain since . She had some rx of her neck but it did not resolve her pain. She has an  mri of her shouldr and found a rtc tear. She had an RTC, DAD dce, biceps and labral debridement on 3/13/19. She c/o of constant aching pain which is worse at times. She is in a sling part time. MD said she could get out of the sling at home. She is waking from pain on occasion. 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 \"  19  Pt states, \" md says I can go back to work next week \"  5/3/19  Pt states, \" improving, using it a little more \"  19 Patient reports shoulder is feeling better she has been using her arm more.    19  Pt states, \" a little more sore from the workout on Tues \"  19  Pt states, \" Go back to work next week \"  19  Pt states, \" improving \"  19 Patient reports shoulder is improving,has been using pulleys at home. 05/23/19 Patient reports she worked full day yesterday and shoulder felt fine.   5/28/19  Pt states, \" doing fairly well, ok with work so far, getting a lot of help \"    Objective:  Initial- prom- flex- 40, abd- 30, ir-30, er- 10, ext- 5    4/4/19  Flex-120, abd-90, ir-50, er- 5/23/19   Flex- 150, abd- 130, ir- 79, er- 60  Strength- 4/5    Exercises:  Exercise/Equipment Resistance/Repetitions Other comments   Nu step  L2 x 6 min    Overhead pulleys X 3 min  gentle    Overhead press  x 30    prom All ranges DOS  3/13/19   Supine flex X 20    scaption 1# x 30    Sl er  3#  30    fencing  resume   Prone row 5# x 30 4 wks   4/10/19   Prone scap retraction arm at side     Standing flex x 30    Rhythmic stab X 1 min at 90       Body blade X 2 min 6 wks  4/24/19    isos towards end range X 20 Into abd and flexion   Gentle behind back stretch 5 x 10    Add/ext Green x 30    Wall slide X 20 1 arm ecc down    Ir/er Yellow  x 30    ranger X 20    [place] circles 1# x 30              HEP     [place] all motions X 5 min    scap retraction X 30    Table flex slide X 3 min     45 degree slide X 3 min    shrugs X 2 min    Ir/er yelllow x 30                Other Therapeutic Activities:      Home Exercise Program:  Patient demonstrated proper technique, good tolerance,  and was given written instructions for the above exercises      Manual Treatments:  Gentle prom, mfr to entire shoulder, scap, traps, levator, , prom all direction min pain x 15 min    Modalities:  Cp x 10    Timed Code Treatment Minutes: 43    Total Treatment Minutes:  54    Assessment  [x] Patient tolerated treatment well [] Patient limited by fatigue  [] Patient limited by pain  [] Patient limited by other medical complications  [x] Other:   Progressing well, was popping more this am with prom  5/16/19  Improving, moving bettera nd getting stronger     Prognosis: [x] Good [] Fair  [] Poor    Patient Requires Follow-up: [x] Yes  []

## 2019-05-30 ENCOUNTER — HOSPITAL ENCOUNTER (OUTPATIENT)
Dept: PHYSICAL THERAPY | Age: 54
Setting detail: THERAPIES SERIES
Discharge: HOME | End: 2019-05-30
Payer: COMMERCIAL

## 2019-05-30 PROCEDURE — 97140 MANUAL THERAPY 1/> REGIONS: CPT

## 2019-05-30 PROCEDURE — 97110 THERAPEUTIC EXERCISES: CPT

## 2019-05-30 NOTE — DISCHARGE SUMMARY
Outpatient Physical Therapy  [] [facility]    Phone: [phone]   Fax: [phone]   [x] [facility]  Phone: [phone]   Fax: [phone]  [] [facility]              Phone: [phone]   Fax: [phone]     Physical Therapy Progress/Discharge Note  Date: 2019        Patient Name:  Munir Oleary    :  1965  MRN: [id number]  Restrictions/Precautions: Follow protocol  Pertinent Medical History:  Medical/Treatment Diagnosis Information:  · Diagnosis: S/P Right RTC Repair  · Treatment Diagnosis: decreased ability to use arm for adl's and work  Insurance/Certification information:  PT Insurance Information: Medical [place]  Physician Information:  Referring Practitioner: Dr. Merly Weaver of care signed (Y/N):  routed  Visit# / total visits:  Pain level:      1-2/10                Plan of Care/Treatment to date:  [x] Therapeutic Exercise    [] Modalities:  [x] Therapeutic Activity     [x] Ultrasound  [x] Electrical Stimulation  [] Gait Training      [] Cervical Traction    [] Lumbar Traction  [x] Neuromuscular Re-education  [x] Cold/hotpack [] Iontophoresis  [x] Instruction in HEP      Other:  [x] Manual Therapy       []    [] Aquatic Therapy       []                    ? Significant Findings At Last Visit/Comments:    Subjective:Pt states, \" Doing well, just a little weak still \"          Objective:  Flex- 150, abd- 145, ir-60, er-70 ext- L4  strength- 4/5     Assessment:  Pt has been seen for 18 rx. Scottie Bautista is approaching normal.  She still needs work on her strength and will do this with her hep. Pt has met goals and is I with hep. She only has 2 rx left with her insurance. DCPT    Progress towards goals: met     Current Frequency/Duration:  # Days per week: [] 1 day # Weeks: [] 1 week [] 4 weeks      [x] 2 days?    [] 2 weeks [] 5 weeks      [x] 3 days   [] 3 weeks [x] 6 weeks     Rehab Potential: [x] Excellent [] Good [] Fair  [] Poor     Goal Status:  [x] Achieved [] Partially Achieved  [] Not Achieved     Patient Status: [] Continue per initial plan of Care     [x] Patient now discharged     [] Additional visits requested, Please re-certify for additional visits:      Requested frequency/duration:  X/week for weeks    Electronically signed by:  Alfonso Mendes PT    If you have any questions or concerns, please don't hesitate to call.   Thank you for your referral.    Physician Signature:________________________________Date:__________________  By signing above, therapists plan is approved by physician

## 2019-05-30 NOTE — FLOWSHEET NOTE
Physical Therapy Daily Treatment Note  Date:  2019    Patient Name:  Kayla Aponte    :  1965  MRN: [id number]  Restrictions/Precautions: Follow protocol  Pertinent Medical History:  Medical/Treatment Diagnosis Information:  · Diagnosis: S/P Right RTC Repair  · Treatment Diagnosis: decreased ability to use arm for adl's and work  Insurance/Certification information:  PT Insurance Information: [employer] [place]  Physician Information:  Referring Practitioner: Dr. Kaden Ramírez of care signed (Y/N):  routed  Visit# / total visits:  Pain level: -2/10            Progress Note: []  Yes  []  No  Next due by: Visit #10      History of Injury: Pt is a 47 y/o female with a hx of right cerv and shoulder pain since . She had some rx of her neck but it did not resolve her pain. She has an  mri of her shouldr and found a rtc tear. She had an RTC, DAD dce, biceps and labral debridement on 3/13/19. She c/o of constant aching pain which is worse at times. She is in a sling part time. MD said she could get out of the sling at home. She is waking from pain on occasion. 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 \"  19  Pt states, \" md says I can go back to work next week \"  5/3/19  Pt states, \" improving, using it a little more \"  19 Patient reports shoulder is feeling better she has been using her arm more.    19  Pt states, \" a little more sore from the workout on Tues \"  19  Pt states, \" Go back to work next week \"  19  Pt states, \" improving \"  19 Patient reports shoulder is improving,has been using pulleys at home. 05/23/19 Patient reports she worked full day yesterday and shoulder felt fine.   5/28/19  Pt states, \" doing fairly well, ok with work so far, getting a lot of help \"  5/30/19  Pt states, \" doing well, back to work, not doing full duty yet \"    Objective:  Initial- prom- flex- 40, abd- 30, ir-30, er- 8, ext- 5    4/4/19  Flex-120, abd-90, ir-50, er- 5/23/19   Flex- 150, abd- 130, ir- 79, er- 60  Strength- 4/5    Exercises:  Exercise/Equipment Resistance/Repetitions Other comments   Nu step  L2 x 6 min    Overhead pulleys X 3 min  gentle    Overhead press  x 30    prom All ranges DOS  3/13/19   Supine flex X 20    scaption 1# x 30    Sl er  3#  30    fencing  resume   Prone row 5# x 30 4 wks   4/10/19   Prone scap retraction arm at side     Standing flex x 30    Rhythmic stab X 1 min at 90       Body blade X 2 min 6 wks  4/24/19    isos towards end range X 20 Into abd and flexion   Gentle behind back stretch 5 x 10    Add/ext Green x 30    Wall slide X 20 1 arm ecc down    Ir/er Yellow  x 30    ranger X 20    Hollywood circles 1# x 30              HEP     Codmans all motions X 5 min    scap retraction X 30    Table flex slide X 3 min     45 degree slide X 3 min    shrugs X 2 min    Ir/er yelllow x 30      Went over hep added concentric exs through range          Other Therapeutic Activities:      Home Exercise Program:  Patient demonstrated proper technique, good tolerance,  and was given written instructions for the above exercises      Manual Treatments:  Gentle prom, mfr to entire shoulder, scap, traps, levator, , prom all direction min pain x 15 min    Modalities:  Cp x 10    Timed Code Treatment Minutes: 43    Total Treatment Minutes:  54    Assessment  [x] Patient tolerated treatment well [] Patient limited by fatigue  [] Patient limited by pain  [] Patient limited by other medical complications  [x] Other:   Progressing well, was popping more this am with prom  5/16/19  Improving, moving bettera nd getting stronger     Prognosis: [x] Good [] Fair  [] Poor    Patient Requires Follow-up: [x] Yes  [] No    Plan:   [x] Continue per plan of care [] Alter current plan (see comments)  [] Plan of care initiated [] Hold pending MD visit [] Discharge  Plan for Next Session:   Right UE arom, aarom, prom, strengthening, scapular strength, myofascial release, joint mobs to gh, sc, ac, scapular thoracic, modalities for pain,Follow protocol for rtc repair  hep 4/9/19  Started gentle painfree scott today, to see md on Mon,  Progress per md, 5/1/19  md says to aggressively strengthen      Electronically signed by:  Matthew Nugent, PT

## 2019-06-03 ENCOUNTER — OFFICE VISIT (OUTPATIENT)
Dept: ORTHOPEDIC SURGERY | Age: 54
End: 2019-06-03

## 2019-06-03 VITALS — HEIGHT: 64 IN | RESPIRATION RATE: 16 BRPM | WEIGHT: 200 LBS | BODY MASS INDEX: 34.15 KG/M2

## 2019-06-03 DIAGNOSIS — Z98.890 S/P RIGHT ROTATOR CUFF REPAIR: Primary | ICD-10-CM

## 2019-06-03 PROCEDURE — 99024 POSTOP FOLLOW-UP VISIT: CPT | Performed by: ORTHOPAEDIC SURGERY

## 2019-09-09 LAB — TSH SERPL DL<=0.05 MIU/L-ACNC: 2.42 UIU/ML (ref 0.27–4.2)

## 2020-03-04 ENCOUNTER — EMPLOYEE WELLNESS (OUTPATIENT)
Dept: OTHER | Age: 55
End: 2020-03-04

## 2020-03-04 LAB
CHOLESTEROL, TOTAL: 174 MG/DL (ref 0–199)
GLUCOSE BLD-MCNC: 105 MG/DL (ref 70–99)
HDLC SERPL-MCNC: 41 MG/DL (ref 40–60)
LDL CHOLESTEROL CALCULATED: 116 MG/DL
TRIGL SERPL-MCNC: 85 MG/DL (ref 0–150)

## 2020-03-07 LAB
3-OH-COTININE: <2 NG/ML
COTININE: <2 NG/ML
NICOTINE: <2 NG/ML

## 2020-04-01 ENCOUNTER — OFFICE VISIT (OUTPATIENT)
Dept: FAMILY MEDICINE CLINIC | Age: 55
End: 2020-04-01
Payer: COMMERCIAL

## 2020-04-01 VITALS
HEART RATE: 68 BPM | WEIGHT: 211.38 LBS | HEIGHT: 63 IN | RESPIRATION RATE: 16 BRPM | BODY MASS INDEX: 37.45 KG/M2 | SYSTOLIC BLOOD PRESSURE: 104 MMHG | OXYGEN SATURATION: 97 % | DIASTOLIC BLOOD PRESSURE: 72 MMHG

## 2020-04-01 PROBLEM — N32.81 OAB (OVERACTIVE BLADDER): Status: ACTIVE | Noted: 2020-04-01

## 2020-04-01 PROCEDURE — 99396 PREV VISIT EST AGE 40-64: CPT | Performed by: FAMILY MEDICINE

## 2020-04-01 ASSESSMENT — PATIENT HEALTH QUESTIONNAIRE - PHQ9
2. FEELING DOWN, DEPRESSED OR HOPELESS: 0
SUM OF ALL RESPONSES TO PHQ9 QUESTIONS 1 & 2: 0
SUM OF ALL RESPONSES TO PHQ QUESTIONS 1-9: 0
1. LITTLE INTEREST OR PLEASURE IN DOING THINGS: 0
SUM OF ALL RESPONSES TO PHQ QUESTIONS 1-9: 0

## 2020-04-01 NOTE — PROGRESS NOTES
History and Physical      Brien Hashimoto  YOB: 1965    Date of Service:  4/1/2020    Chief Complaint:   Brien Hashimoto is a 63 y.o. female who presents for complete physical examination    HPI: Patient presents for physical examination is concerned about elevated blood sugar on her screening test from work. Patient does have a strong family history of diabetes mellitus. Her blood sugar is always been stable in the past.  Patient continues under gynecology care for anxiety and is taking citalopram medication for anxiety along with Ditropan for overactive bladder. 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 Patient also underwent right shoulder surgery 1 year ago and is done extremely well.     Wt Readings from Last 3 Encounters:   04/01/20 211 lb 6 oz (95.9 kg)   06/03/19 200 lb (90.7 kg)   04/29/19 200 lb (90.7 kg)     BP Readings from Last 3 Encounters:   04/01/20 104/72   03/13/19 (!) 111/56   03/13/19 (!) 146/80       Patient Active Problem List   Diagnosis    Anxiety    Migraine    OAB (overactive bladder)       Allergies   Allergen Reactions    Penicillins Rash    Sulfa Antibiotics Rash     Outpatient Medications Marked as Taking for the 4/1/20 encounter (Office Visit) with Alyx Diego MD   Medication Sig Dispense Refill    ibuprofen (ADVIL;MOTRIN) 200 MG tablet Take 200 mg by mouth every 6 hours as needed for Pain      oxybutynin (DITROPAN) 5 MG tablet Take 5 mg by mouth nightly       citalopram (CELEXA) 40 MG tablet Take 40 mg by mouth nightly          Past Medical History:   Diagnosis Date    Anxiety     Endometriosis 1987    laporoscopy: endometriosis 1987    Migraine, unspecified, without mention of intractable migraine without mention of status migrainosus      Past Surgical History:   Procedure Laterality Date    CARPAL TUNNEL RELEASE Right     ENDOMETRIAL ABLATION      EPIDURAL STEROID gynecologist.  Breast exam:  not examined. Musculoskeletal: Normal range of motion, no synovitis. She exhibits no edema. Neurological: She is alert and oriented to person, place, and time. She has normal reflexes. No cranial nerve deficit. Coordination normal.   Skin: Skin is warm and dry. There is no rash or erythema. No suspicious lesions noted. Psychiatric: She has a normal mood and affect. Her speech is normal and behavior is normal. Judgment, cognition and memory are normal.     Preventive Care:  Health Maintenance   Topic Date Due    Diabetes screen  03/30/2004    Shingles Vaccine (1 of 2) 04/01/2021 (Originally 3/30/2014)    Breast cancer screen  10/04/2020    Cervical cancer screen  09/09/2022    Lipid screen  03/04/2025    DTaP/Tdap/Td vaccine (2 - Td) 10/07/2025    Colon cancer screen colonoscopy  09/26/2027    Flu vaccine  Completed    Hepatitis C screen  Completed    HIV screen  Completed    Hepatitis A vaccine  Aged Out    Hepatitis B vaccine  Aged Out    Hib vaccine  Aged Out    Meningococcal (ACWY) vaccine  Aged Out    Pneumococcal 0-64 years Vaccine  Aged Out           Preventive plan of care for Missael Rachel        4/1/2020           Preventive Measures Status       Recommendations for screening   Colon Cancer Screen   Last colonoscopy: 2017 Repeat in 10 years   Breast Cancer Screen  Last mammogram: 2019  Repeat every 2 years   Cervical Cancer Screen   Last PAP smear: 2019 Repeat in 5 years   Osteoporosis Screen   Last DXA scan: no This test is not clinically indicated   Diabetes Screen  Glucose (mg/dL)   Date Value   03/04/2020 105 (H)    Test recommended and ordered   Cholesterol Screen  Lab Results   Component Value Date    CHOL 174 03/04/2020    TRIG 85 03/04/2020    HDL 41 03/04/2020    LDLCALC 116 (H) 03/04/2020    Repeat every 2 years   Aspirin for Cardiovascular Prevention   No Not indicated   Weight: Body mass index is 37.15 kg/m².   5' 3.25\" (1.607 m)211 lb 6 oz

## 2020-04-02 DIAGNOSIS — Z00.00 WELL ADULT EXAM: ICD-10-CM

## 2020-04-02 LAB
ESTIMATED AVERAGE GLUCOSE: 116.9 MG/DL
HBA1C MFR BLD: 5.7 %
HCT VFR BLD CALC: 41 % (ref 36–48)
HEMOGLOBIN: 13.5 G/DL (ref 12–16)
MCH RBC QN AUTO: 28.3 PG (ref 26–34)
MCHC RBC AUTO-ENTMCNC: 33.1 G/DL (ref 31–36)
MCV RBC AUTO: 85.4 FL (ref 80–100)
PDW BLD-RTO: 14.5 % (ref 12.4–15.4)
PLATELET # BLD: 188 K/UL (ref 135–450)
PMV BLD AUTO: 9.5 FL (ref 5–10.5)
RBC # BLD: 4.8 M/UL (ref 4–5.2)
TSH SERPL DL<=0.05 MIU/L-ACNC: 2.26 UIU/ML (ref 0.27–4.2)
WBC # BLD: 4.3 K/UL (ref 4–11)

## 2020-08-28 ENCOUNTER — HOSPITAL ENCOUNTER (OUTPATIENT)
Dept: WOMENS IMAGING | Age: 55
Discharge: HOME | End: 2020-08-28
Payer: COMMERCIAL

## 2020-08-28 PROCEDURE — 77067 SCR MAMMO BI INCL CAD: CPT

## 2020-10-19 VITALS — WEIGHT: 210 LBS | BODY MASS INDEX: 36.33 KG/M2

## 2021-05-11 ENCOUNTER — OFFICE VISIT (OUTPATIENT)
Dept: FAMILY MEDICINE CLINIC | Age: 56
End: 2021-05-11
Payer: COMMERCIAL

## 2021-05-11 ENCOUNTER — NURSE TRIAGE (OUTPATIENT)
Dept: OTHER | Facility: CLINIC | Age: 56
End: 2021-05-11

## 2021-05-11 VITALS
HEART RATE: 67 BPM | WEIGHT: 216 LBS | SYSTOLIC BLOOD PRESSURE: 94 MMHG | DIASTOLIC BLOOD PRESSURE: 46 MMHG | OXYGEN SATURATION: 98 % | TEMPERATURE: 96.5 F | BODY MASS INDEX: 37.96 KG/M2

## 2021-05-11 DIAGNOSIS — R43.9 SENSE OF SMELL ALTERED: ICD-10-CM

## 2021-05-11 DIAGNOSIS — R06.09 DOE (DYSPNEA ON EXERTION): Primary | ICD-10-CM

## 2021-05-11 PROCEDURE — 93000 ELECTROCARDIOGRAM COMPLETE: CPT | Performed by: FAMILY MEDICINE

## 2021-05-11 PROCEDURE — 99214 OFFICE O/P EST MOD 30 MIN: CPT | Performed by: FAMILY MEDICINE

## 2021-05-11 NOTE — PROGRESS NOTES
Subjective:      Patient ID: Ivet Cheung is a 61 y.o. female. CC: Patient presents for acute medical problem-change of smell and dyspnea on exertion. . Medical assistant notes reviewed. HPI Patient presents with having issues with getting short of breath, feeling tired, things smell different for her, and taste differently since getting her last Covid 19 vaccine back in February. She has had to throw out some things because the smell is too much for her. Patient states she said these issues for the last 2 months ever since she had her last Covid vaccination. She is also been more concerned in the last 4 to 6 weeks that she has dyspnea on exertion. She finds her walking to work to become short of breath or walking up a flight of steps and becomes short of breath. She denies any orthopnea. She does have a strong family history of heart disease in multiple family members. Family history was updated today. She denies any sinus issues and medicines are unchanged    Review of Systems     Allergies   Allergen Reactions    Penicillins Rash    Sulfa Antibiotics Rash     Current Outpatient Medications on File Prior to Visit   Medication Sig Dispense Refill    ibuprofen (ADVIL;MOTRIN) 200 MG tablet Take 200 mg by mouth every 6 hours as needed for Pain      oxybutynin (DITROPAN) 5 MG tablet Take 5 mg by mouth nightly       citalopram (CELEXA) 40 MG tablet Take 40 mg by mouth nightly        No current facility-administered medications on file prior to visit. Objective:   Physical Exam  Constitutional:       General: She is not in acute distress. Appearance: She is well-developed. Neck:      Thyroid: No thyroid mass, thyromegaly or thyroid tenderness. Vascular: No carotid bruit. Cardiovascular:      Rate and Rhythm: Normal rate and regular rhythm. Pulses:           Dorsalis pedis pulses are 2+ on the right side and 2+ on the left side.         Posterior tibial pulses are 2+ on the right side and 2+ on the left side. Heart sounds: Normal heart sounds. No murmur. No friction rub. No gallop. Pulmonary:      Effort: Pulmonary effort is normal.      Breath sounds: Normal breath sounds. Musculoskeletal: Normal range of motion. General: No tenderness. Right lower leg: No edema. Left lower leg: No edema. Lymphadenopathy:      Cervical: No cervical adenopathy. Neurological:      Mental Status: She is alert and oriented to person, place, and time. Sensory: Sensation is intact. Motor: Motor function is intact. Psychiatric:         Behavior: Behavior is cooperative. Assessment:      Zahra Ni was seen today for shortness of breath. Diagnoses and all orders for this visit:    EDWARDS (dyspnea on exertion)  -     EKG 12 Lead  -     TSH without Reflex; Future  -     Comprehensive Metabolic Panel; Future  -     Lipid Panel; Future  -     CBC; Future  -     Stress test, myoview; Future    Sense of smell altered            Plan:      ECG performed in the office is unchanged from prior electrocardiograms. Proceed with laboratory testing and assuming these are normal she needs a stress test.    No additional medications at this time. Decide on further management after laboratory and stress test results    Medical decision making of moderate complexity. Please note that this chart was generated using Dragon dictation software. Although every effort was made to ensure the accuracy of this automated transcription, some errors in transcription may have occurred.

## 2021-05-11 NOTE — TELEPHONE ENCOUNTER
Reason for Disposition   Nursing judgment   MILD difficulty breathing (e.g., minimal/no SOB at rest, SOB with walking, pulse < 100) of new onset or worse than normal    Answer Assessment - Initial Assessment Questions  1. RESPIRATORY STATUS: \"Describe your breathing? \" (e.g., wheezing, shortness of breath, unable to speak, severe coughing)       Short of breath with exertion, \"a little out of breath\". 2. ONSET: \"When did this breathing problem begin? \"       About a month ago    3. PATTERN \"Does the difficult breathing come and go, or has it been constant since it started? \"       Comes and goes    4. SEVERITY: \"How bad is your breathing? \" (e.g., mild, moderate, severe)     - MILD: No SOB at rest, mild SOB with walking, speaks normally in sentences, can lay down, no retractions, pulse < 100.     - MODERATE: SOB at rest, SOB with minimal exertion and prefers to sit, cannot lie down flat, speaks in phrases, mild retractions, audible wheezing, pulse 100-120.     - SEVERE: Very SOB at rest, speaks in single words, struggling to breathe, sitting hunched forward, retractions, pulse > 120       Mild    5. RECURRENT SYMPTOM: \"Have you had difficulty breathing before? \" If so, ask: \"When was the last time? \" and \"What happened that time? \"       No     6. CARDIAC HISTORY: \"Do you have any history of heart disease? \" (e.g., heart attack, angina, bypass surgery, angioplasty)       2013 had \"spell\" with sweating, heartburn had stress test.  Doesn't really know results. 7. LUNG HISTORY: \"Do you have any history of lung disease? \"  (e.g., pulmonary embolus, asthma, emphysema)      No     8. CAUSE: \"What do you think is causing the breathing problem? \"       Unsure    9. OTHER SYMPTOMS: \"Do you have any other symptoms? (e.g., dizziness, runny nose, cough, chest pain, fever)      Occasional cough when drinks something cold, taste change as previously noted. 10. PREGNANCY: \"Is there any chance you are pregnant? \" \"When was

## 2021-05-12 DIAGNOSIS — R06.09 DOE (DYSPNEA ON EXERTION): ICD-10-CM

## 2021-05-12 LAB
A/G RATIO: 1.8 (ref 1.1–2.2)
ALBUMIN SERPL-MCNC: 4.8 G/DL (ref 3.4–5)
ALP BLD-CCNC: 78 U/L (ref 40–129)
ALT SERPL-CCNC: 18 U/L (ref 10–40)
ANION GAP SERPL CALCULATED.3IONS-SCNC: 13 MMOL/L (ref 3–16)
AST SERPL-CCNC: 18 U/L (ref 15–37)
BILIRUB SERPL-MCNC: 0.3 MG/DL (ref 0–1)
BUN BLDV-MCNC: 15 MG/DL (ref 7–20)
CALCIUM SERPL-MCNC: 9.2 MG/DL (ref 8.3–10.6)
CHLORIDE BLD-SCNC: 104 MMOL/L (ref 99–110)
CHOLESTEROL, TOTAL: 205 MG/DL (ref 0–199)
CO2: 25 MMOL/L (ref 21–32)
CREAT SERPL-MCNC: 0.7 MG/DL (ref 0.6–1.1)
GFR AFRICAN AMERICAN: >60
GFR NON-AFRICAN AMERICAN: >60
GLOBULIN: 2.6 G/DL
GLUCOSE BLD-MCNC: 90 MG/DL (ref 70–99)
HCT VFR BLD CALC: 40.5 % (ref 36–48)
HDLC SERPL-MCNC: 39 MG/DL (ref 40–60)
HEMOGLOBIN: 13.6 G/DL (ref 12–16)
LDL CHOLESTEROL CALCULATED: 138 MG/DL
MCH RBC QN AUTO: 28.9 PG (ref 26–34)
MCHC RBC AUTO-ENTMCNC: 33.6 G/DL (ref 31–36)
MCV RBC AUTO: 86.1 FL (ref 80–100)
PDW BLD-RTO: 14.6 % (ref 12.4–15.4)
PLATELET # BLD: 179 K/UL (ref 135–450)
PMV BLD AUTO: 8.8 FL (ref 5–10.5)
POTASSIUM SERPL-SCNC: 4 MMOL/L (ref 3.5–5.1)
RBC # BLD: 4.7 M/UL (ref 4–5.2)
SODIUM BLD-SCNC: 142 MMOL/L (ref 136–145)
TOTAL PROTEIN: 7.4 G/DL (ref 6.4–8.2)
TRIGL SERPL-MCNC: 139 MG/DL (ref 0–150)
TSH SERPL DL<=0.05 MIU/L-ACNC: 2.16 UIU/ML (ref 0.27–4.2)
VLDLC SERPL CALC-MCNC: 28 MG/DL
WBC # BLD: 4.7 K/UL (ref 4–11)

## 2021-05-19 ENCOUNTER — HOSPITAL ENCOUNTER (OUTPATIENT)
Dept: NON INVASIVE DIAGNOSTICS | Age: 56
Discharge: HOME | End: 2021-05-19
Payer: COMMERCIAL

## 2021-05-19 DIAGNOSIS — R06.09 DOE (DYSPNEA ON EXERTION): ICD-10-CM

## 2021-05-19 LAB
LV EF: 67 %
LVEF MODALITY: NORMAL

## 2021-05-19 PROCEDURE — A9502 TC99M TETROFOSMIN: HCPCS | Performed by: FAMILY MEDICINE

## 2021-05-19 PROCEDURE — 3430000000 HC RX DIAGNOSTIC RADIOPHARMACEUTICAL: Performed by: FAMILY MEDICINE

## 2021-05-19 PROCEDURE — 93017 CV STRESS TEST TRACING ONLY: CPT | Performed by: INTERNAL MEDICINE

## 2021-05-19 PROCEDURE — 78452 HT MUSCLE IMAGE SPECT MULT: CPT | Performed by: INTERNAL MEDICINE

## 2021-05-19 RX ADMIN — TETROFOSMIN 30 MILLICURIE: 1.38 INJECTION, POWDER, LYOPHILIZED, FOR SOLUTION INTRAVENOUS at 09:37

## 2021-05-19 RX ADMIN — TETROFOSMIN 10 MILLICURIE: 1.38 INJECTION, POWDER, LYOPHILIZED, FOR SOLUTION INTRAVENOUS at 07:46

## 2021-05-19 NOTE — PROGRESS NOTES
Patient instructed on Jaswant Protocol Stress Test Procedure including possible side effects and adverse reactions. Verbalizes knowledge and understanding and denies having any questions.

## 2021-05-27 ENCOUNTER — TELEPHONE (OUTPATIENT)
Dept: FAMILY MEDICINE CLINIC | Age: 56
End: 2021-05-27

## 2021-05-27 DIAGNOSIS — R06.02 SHORTNESS OF BREATH: Primary | ICD-10-CM

## 2021-05-27 NOTE — TELEPHONE ENCOUNTER
Patient returning call about her recent Stress Test result Elena Morataya called her on 5/26 but she missed the call).     Provider out of office      Please advise

## 2021-06-02 ENCOUNTER — HOSPITAL ENCOUNTER (OUTPATIENT)
Age: 56
Discharge: HOME | End: 2021-06-02
Payer: COMMERCIAL

## 2021-06-02 ENCOUNTER — HOSPITAL ENCOUNTER (OUTPATIENT)
Dept: GENERAL RADIOLOGY | Age: 56
Discharge: HOME | End: 2021-06-02
Payer: COMMERCIAL

## 2021-06-02 DIAGNOSIS — R06.02 SHORTNESS OF BREATH: ICD-10-CM

## 2021-06-02 PROCEDURE — 71046 X-RAY EXAM CHEST 2 VIEWS: CPT

## 2021-07-28 LAB
CHOLESTEROL, TOTAL: 191 MG/DL (ref 0–199)
GLUCOSE BLD-MCNC: 98 MG/DL (ref 70–99)
HDLC SERPL-MCNC: 42 MG/DL (ref 40–60)
LDL CHOLESTEROL CALCULATED: 126 MG/DL
TRIGL SERPL-MCNC: 116 MG/DL (ref 0–150)

## 2021-09-01 LAB
ESTRADIOL LEVEL: 14 PG/ML
FOLLICLE STIMULATING HORMONE: 59.4 MIU/ML
TSH REFLEX: 2.29 UIU/ML (ref 0.27–4.2)

## 2021-09-03 LAB
SEX HORMONE BINDING GLOBULIN: 33 NMOL/L (ref 30–135)
TESTOSTERONE FREE-NONMALE: 4.7 PG/ML (ref 0.6–3.8)
TESTOSTERONE TOTAL: 26 NG/DL (ref 20–70)

## 2021-10-21 ENCOUNTER — HOSPITAL ENCOUNTER (OUTPATIENT)
Age: 56
Discharge: HOME | End: 2021-10-21
Payer: COMMERCIAL

## 2021-10-21 LAB
ESTRADIOL LEVEL: 63 PG/ML
FOLLICLE STIMULATING HORMONE: 21.3 MIU/ML

## 2021-10-21 PROCEDURE — 83001 ASSAY OF GONADOTROPIN (FSH): CPT

## 2021-10-21 PROCEDURE — 36415 COLL VENOUS BLD VENIPUNCTURE: CPT

## 2021-10-21 PROCEDURE — 84403 ASSAY OF TOTAL TESTOSTERONE: CPT

## 2021-10-21 PROCEDURE — 82670 ASSAY OF TOTAL ESTRADIOL: CPT

## 2021-10-25 LAB — TESTOSTERONE TOTAL: 150 NG/DL (ref 20–70)

## 2021-11-16 ENCOUNTER — HOSPITAL ENCOUNTER (OUTPATIENT)
Dept: WOMENS IMAGING | Age: 56
Discharge: HOME | End: 2021-11-16
Payer: COMMERCIAL

## 2021-11-16 VITALS — BODY MASS INDEX: 38.27 KG/M2 | WEIGHT: 216 LBS | HEIGHT: 63 IN

## 2021-11-16 DIAGNOSIS — Z12.31 BREAST CANCER SCREENING BY MAMMOGRAM: ICD-10-CM

## 2021-11-16 PROCEDURE — 77067 SCR MAMMO BI INCL CAD: CPT

## 2021-12-13 ENCOUNTER — HOSPITAL ENCOUNTER (OUTPATIENT)
Age: 56
Discharge: HOME | End: 2021-12-13
Payer: COMMERCIAL

## 2021-12-13 LAB
ESTRADIOL LEVEL: 58 PG/ML
FOLLICLE STIMULATING HORMONE: 22.2 MIU/ML

## 2021-12-13 PROCEDURE — 83001 ASSAY OF GONADOTROPIN (FSH): CPT

## 2021-12-13 PROCEDURE — 36415 COLL VENOUS BLD VENIPUNCTURE: CPT

## 2021-12-13 PROCEDURE — 84403 ASSAY OF TOTAL TESTOSTERONE: CPT

## 2021-12-13 PROCEDURE — 82670 ASSAY OF TOTAL ESTRADIOL: CPT

## 2021-12-15 LAB — TESTOSTERONE TOTAL: 73 NG/DL (ref 20–70)

## 2022-03-17 ENCOUNTER — HOSPITAL ENCOUNTER (OUTPATIENT)
Age: 57
Discharge: HOME | End: 2022-03-17
Payer: COMMERCIAL

## 2022-03-17 LAB
ESTRADIOL LEVEL: 78 PG/ML
FOLLICLE STIMULATING HORMONE: 13.8 MIU/ML

## 2022-03-17 PROCEDURE — 83001 ASSAY OF GONADOTROPIN (FSH): CPT

## 2022-03-17 PROCEDURE — 82670 ASSAY OF TOTAL ESTRADIOL: CPT

## 2022-03-17 PROCEDURE — 36415 COLL VENOUS BLD VENIPUNCTURE: CPT

## 2022-03-17 PROCEDURE — 84403 ASSAY OF TOTAL TESTOSTERONE: CPT

## 2022-03-19 LAB — TESTOSTERONE TOTAL: 56 NG/DL (ref 20–70)

## 2022-04-28 LAB
ESTRADIOL LEVEL: 103 PG/ML
FOLLICLE STIMULATING HORMONE: 7.8 MIU/ML

## 2022-05-03 LAB — TESTOSTERONE TOTAL: 195 NG/DL (ref 20–70)

## 2022-05-13 LAB — ESTRONE: 60.6 PG/ML

## 2022-08-01 LAB
CHOLESTEROL, TOTAL: 152 MG/DL (ref 0–199)
GLUCOSE BLD-MCNC: 76 MG/DL (ref 70–99)
HDLC SERPL-MCNC: 31 MG/DL (ref 40–60)
LDL CHOLESTEROL CALCULATED: 106 MG/DL
TRIGL SERPL-MCNC: 76 MG/DL (ref 0–150)

## 2022-08-04 ENCOUNTER — OFFICE VISIT (OUTPATIENT)
Dept: FAMILY MEDICINE CLINIC | Age: 57
End: 2022-08-04
Payer: COMMERCIAL

## 2022-08-04 VITALS — TEMPERATURE: 98.4 F | HEART RATE: 60 BPM | OXYGEN SATURATION: 98 %

## 2022-08-04 DIAGNOSIS — H65.02 NON-RECURRENT ACUTE SEROUS OTITIS MEDIA OF LEFT EAR: Primary | ICD-10-CM

## 2022-08-04 PROCEDURE — 69209 REMOVE IMPACTED EAR WAX UNI: CPT | Performed by: FAMILY MEDICINE

## 2022-08-04 PROCEDURE — 99213 OFFICE O/P EST LOW 20 MIN: CPT | Performed by: FAMILY MEDICINE

## 2022-08-04 RX ORDER — METHYLPREDNISOLONE 4 MG/1
TABLET ORAL
Qty: 1 KIT | Refills: 0 | Status: SHIPPED | OUTPATIENT
Start: 2022-08-04 | End: 2022-08-10

## 2022-08-04 RX ORDER — GUAIFENESIN, PSEUDOEPHEDRINE HYDROCHLORIDE 600; 60 MG/1; MG/1
1 TABLET, EXTENDED RELEASE ORAL EVERY 12 HOURS
Qty: 20 TABLET | Refills: 1 | Status: SHIPPED | OUTPATIENT
Start: 2022-08-04 | End: 2022-08-14

## 2022-08-04 NOTE — PROGRESS NOTES
L>R  [x] Oropharynx [x] Clear [] Red [] Exudate [] Swollen    [x] No adenopathy [] Adenopathy __________    [x] Lungs clear with good movement and effort  [x] Breathing appears normal     [x] Speaks in complete sentences  [] Appears tachypneic   [] Wheezing           [] Rhonchi   [] Decreased    [x] CV RRR  [x] No Murmur  [] Murmur  [] Irregular  [] Tachycardic    [x] OTHER:  1} cerumen impaction      TESTS ORDERED:    [] POCT FLU  [] POCT STREP  [] COVID-19 Test sent  [] Appointment made at testing clinic for patient to get a COVID test.       TEST RESULTS:    POCT FLU test:  [] Positive  [] Negative  POCT STREP test:  [] Positive  [] Negative    ASSESSMENT:  [] Allergic Rhinitis  [] Asthma Exacerbation  [] Bronchitis  [] COPD Exacerbation  [] Gastroenteritis  [] Influenza  [] Sinusitis  [] Strep Throat [] Sore Throat  [] Viral URI   [] Possible COVID-19   [] Exposure to COVID -19  [] Positive for COVID  [] Screening for Viral Disease (COVID test no sx)        Yanci Macias was seen today for cough. Diagnoses and all orders for this visit:    Non-recurrent acute serous otitis media of left ear  -     pseudoephedrine-guaiFENesin (MUCINEX D)  MG per extended release tablet; Take 1 tablet by mouth in the morning and 1 tablet in the evening. Do all this for 10 days. -     methylPREDNISolone (MEDROL DOSEPACK) 4 MG tablet; Take by mouth.             [x] Low risk for complications from COVID 19  [] Moderate risk for complications from COVID 19  [] High risk for complications from COVID 19    PLAN:    [] Discharge home with written instructions for:  [] Flu management  [] Strep throat management  [] Viral respiratory illness management  [] Sinusitis management  [] Bronchitis Management  [] Possible COVID-19 infection with self-quarantine and management of symptoms  [] Follow-up with primary care physician or emergency department if worsens  [] Note given for work    [] Referred to emergency department for evaluation      Scribe attestation: Siddhartha Mtz LPN, nancy scribing for and in the presence of Yuliana Guajardo MD. Electronically signed by Mecca Garcia LPN on 4/3/21 at 5:05 PM EDT      Ear Irrigation Procedure Note    Ear irrigation procedure was performed using a WaterPik / Elephant ear to the left ear(s) for cerumen impaction. The remaining wax and debris was removed with curette  instrumentation. The procedure was successful.   The patient had no complications

## 2022-11-29 ENCOUNTER — HOSPITAL ENCOUNTER (OUTPATIENT)
Dept: WOMENS IMAGING | Age: 57
Discharge: HOME | End: 2022-11-29
Payer: COMMERCIAL

## 2022-11-29 VITALS — WEIGHT: 150 LBS | HEIGHT: 63 IN | BODY MASS INDEX: 26.58 KG/M2

## 2022-11-29 DIAGNOSIS — Z12.31 ENCOUNTER FOR SCREENING MAMMOGRAM FOR BREAST CANCER: ICD-10-CM

## 2022-11-29 PROCEDURE — 77063 BREAST TOMOSYNTHESIS BI: CPT

## 2022-12-12 ENCOUNTER — NURSE TRIAGE (OUTPATIENT)
Dept: OTHER | Facility: CLINIC | Age: 57
End: 2022-12-12

## 2022-12-12 NOTE — TELEPHONE ENCOUNTER
Location of patient: [place]    Received call from [facility] at [employer] with Red Flag Complaint. Subjective: Caller states \"its been achy for the past couple weeks but today it is really sore and can hardly lift it\"     Current Symptoms: left shoulder pain, worse with movement. Reports it feels like it is \"catching\" whenever she uses the arm     Onset: a couple weeks ago; worsening    Associated Symptoms: reduced activity    Pain Severity: 5/10; \"catches\"; intermittent    Temperature: no     What has been tried: ibuprofen    LMP: NA Pregnant: NA    Recommended disposition: See PCP within 3 Days    Care advice provided, patient verbalizes understanding; denies any other questions or concerns; instructed to call back for any new or worsening symptoms. Patient/Caller agrees with recommended disposition; writer provided warm transfer to Suha Ruelas at [employer] for appointment scheduling    Attention Provider: Thank you for allowing me to participate in the care of your patient. The patient was connected to triage in response to information provided to the ECC/PSC. Please do not respond through this encounter as the response is not directed to a shared pool.           Reason for Disposition   MODERATE pain (e.g., interferes with normal activities) and present > 3 days    Protocols used: Shoulder Pain-ADULT-[place]

## 2022-12-13 ENCOUNTER — OFFICE VISIT (OUTPATIENT)
Dept: FAMILY MEDICINE CLINIC | Age: 57
End: 2022-12-13
Payer: COMMERCIAL

## 2022-12-13 VITALS
HEART RATE: 55 BPM | WEIGHT: 150 LBS | BODY MASS INDEX: 26.57 KG/M2 | SYSTOLIC BLOOD PRESSURE: 116 MMHG | DIASTOLIC BLOOD PRESSURE: 80 MMHG | OXYGEN SATURATION: 98 % | TEMPERATURE: 97.3 F

## 2022-12-13 DIAGNOSIS — M25.512 ACUTE PAIN OF LEFT SHOULDER: Primary | ICD-10-CM

## 2022-12-13 PROCEDURE — 99213 OFFICE O/P EST LOW 20 MIN: CPT | Performed by: NURSE PRACTITIONER

## 2022-12-13 RX ORDER — MELOXICAM 15 MG/1
15 TABLET ORAL DAILY PRN
Qty: 30 TABLET | Refills: 0 | Status: SHIPPED | OUTPATIENT
Start: 2022-12-13

## 2022-12-13 RX ORDER — CYCLOBENZAPRINE HCL 10 MG
10 TABLET ORAL 3 TIMES DAILY PRN
Qty: 30 TABLET | Refills: 0 | Status: SHIPPED | OUTPATIENT
Start: 2022-12-13 | End: 2022-12-23

## 2022-12-13 RX ORDER — CITALOPRAM 20 MG/1
20 TABLET ORAL DAILY
COMMUNITY

## 2022-12-13 SDOH — ECONOMIC STABILITY: FOOD INSECURITY: WITHIN THE PAST 12 MONTHS, THE FOOD YOU BOUGHT JUST DIDN'T LAST AND YOU DIDN'T HAVE MONEY TO GET MORE.: NEVER TRUE

## 2022-12-13 SDOH — ECONOMIC STABILITY: FOOD INSECURITY: WITHIN THE PAST 12 MONTHS, YOU WORRIED THAT YOUR FOOD WOULD RUN OUT BEFORE YOU GOT MONEY TO BUY MORE.: NEVER TRUE

## 2022-12-13 ASSESSMENT — PATIENT HEALTH QUESTIONNAIRE - PHQ9
SUM OF ALL RESPONSES TO PHQ9 QUESTIONS 1 & 2: 0
SUM OF ALL RESPONSES TO PHQ QUESTIONS 1-9: 0
1. LITTLE INTEREST OR PLEASURE IN DOING THINGS: 0
SUM OF ALL RESPONSES TO PHQ QUESTIONS 1-9: 0
2. FEELING DOWN, DEPRESSED OR HOPELESS: 0

## 2022-12-13 ASSESSMENT — SOCIAL DETERMINANTS OF HEALTH (SDOH): HOW HARD IS IT FOR YOU TO PAY FOR THE VERY BASICS LIKE FOOD, HOUSING, MEDICAL CARE, AND HEATING?: NOT HARD AT ALL

## 2022-12-13 NOTE — PROGRESS NOTES
Belinda Massey  : 1965  Encounter date: 2022    This rosy 61 y.o. female who presents with  Chief Complaint   Patient presents with    Shoulder Pain     Lt side x3wks, gradually getting worse, no injur       History of present illness:    HPI Pt is 61year old female with complaints of L shoulder pain started 2 weeks ago. Denies known trauma or repetition. 2019 history R shoulder rotator cuff repair, previously established with Dr. Margo Horvath, [address]. Pt does report worsened daily stress, palpating shoulder many \"knots\" assessed. Reports does not feel like previous shoulder injury. Pt has taken ibuprofen with little relief. Has not had imaging completed. Current Outpatient Medications on File Prior to Visit   Medication Sig Dispense Refill    citalopram (CELEXA) 20 MG tablet Take 20 mg by mouth daily Take 3 tablets PO daily      ibuprofen (ADVIL;MOTRIN) 200 MG tablet Take 200 mg by mouth every 6 hours as needed for Pain      oxybutynin (DITROPAN) 5 MG tablet Take 5 mg by mouth nightly        No current facility-administered medications on file prior to visit.       Allergies   Allergen Reactions    Penicillins Rash    Sulfa Antibiotics Rash     Past Medical History:   Diagnosis Date    Anxiety     Endometriosis     laporoscopy: endometriosis     Migraine, unspecified, without mention of intractable migraine without mention of status migrainosus       Past Surgical History:   Procedure Laterality Date    CARPAL TUNNEL RELEASE Right     ENDOMETRIAL ABLATION      EPIDURAL STEROID INJECTION N/A 2018    MIDLINE C6/C7 CERVICAL INTERLAMINAR EPIDURAL STEROID INJECTION WITH FLUOROSCOPY performed by Nimco Echevarria MD at [address] [place] [address]      for endometriosis    SHOULDER ARTHROSCOPY Right 3/13/2019    ARTHROSCOPY, LABRAL DEBRIDEMENT, BICEPT TENDON DEBRIDMENT/OPEN YOLANDE, SUBACROMIAL DECOMPRESSION, ROTATOR CUFF REPAIR. performed by Samy Fuller MD at [place] [address] Family History   Problem Relation Age of Onset    Ulcerative Colitis Mother     Hypertension Mother     Asthma Mother     Cancer Mother         lung    Kidney Disease Mother     Heart Disease Father         66 yo    Diabetes Father     Asthma Sister     Cancer Sister         brain    Seizures Sister     Kidney Disease Sister     Diabetes Sister     Kidney Disease Brother     Diabetes Brother     Stroke Maternal Grandfather     Diabetes type 2  Brother     Heart Disease Brother          maker    Cancer Sister         Liver and gallbladder  & lymphoma    Thyroid Disease Sister         Hypothyroidism    Heart Disease Sister         complete heart block      Social History     Tobacco Use    Smoking status: Never    Smokeless tobacco: Never   Substance Use Topics    Alcohol use: Not Currently     Comment: occasional glass of beer        Review of Systems    Objective:    /80 (Site: Right Upper Arm, Position: Sitting, Cuff Size: Medium Adult)   Pulse 55   Temp 97.3 °F (36.3 °C) (Infrared)   Wt 150 lb (68 kg)   SpO2 98%   BMI 26.57 kg/m²   Weight: 150 lb (68 kg)     BP Readings from Last 3 Encounters:   12/13/22 116/80   05/11/21 (!) 94/46   04/01/20 104/72     Wt Readings from Last 3 Encounters:   12/13/22 150 lb (68 kg)   11/29/22 150 lb (68 kg)   11/16/21 216 lb (98 kg)     BMI Readings from Last 3 Encounters:   12/13/22 26.57 kg/m²   11/29/22 26.57 kg/m²   11/16/21 38.26 kg/m²       Physical Exam  Vitals reviewed. Constitutional:       Appearance: Normal appearance. She is well-developed. Cardiovascular:      Rate and Rhythm: Normal rate and regular rhythm. Pulses: Normal pulses. Heart sounds: Normal heart sounds. No murmur heard. Pulmonary:      Effort: Pulmonary effort is normal.      Breath sounds: Normal breath sounds. Musculoskeletal:         General: Tenderness (poor ROM, unable to lift higher than shoulder, pain with palpation) present. No swelling or signs of injury. Skin:     General: Skin is warm and dry. Findings: No bruising. Neurological:      Mental Status: She is alert and oriented to person, place, and time. Motor: No weakness. Psychiatric:         Mood and Affect: Mood normal.       Assessment/Plan    1. Acute pain of left shoulder  Advised stepwise approach  Discussed imaging, PT  Stress relieving tactics  Ice, heat, light stretches, massage  - cyclobenzaprine (FLEXERIL) 10 MG tablet; Take 1 tablet by mouth 3 times daily as needed for Muscle spasms  Dispense: 30 tablet; Refill: 0  - meloxicam (MOBIC) 15 MG tablet; Take 1 tablet by mouth daily as needed for Pain  Dispense: 30 tablet; Refill: 0    Return if symptoms worsen or fail to improve, for unresolved symptoms. This dictation was generated by voice recognition computer software. Although all attempts are made to edit the dictation for accuracy, there may be errors in the transcription that are not intended.

## 2023-03-22 ENCOUNTER — TELEPHONE (OUTPATIENT)
Dept: FAMILY MEDICINE CLINIC | Age: 58
End: 2023-03-22

## 2023-03-22 NOTE — TELEPHONE ENCOUNTER
----- Message from Diana Chapa sent at 3/22/2023 10:58 AM EDT -----  Subject: Appointment Request    Reason for Call: Established Patient Appointment needed: Semi-Routine Skin   Problem    QUESTIONS    Reason for appointment request? No appointments available during search     Additional Information for Provider? pt would like to see Dr. Calvin Bhatti for   a spot on the right side of her neck for a semi-routine visit.   ---------------------------------------------------------------------------  --------------  Deepak FONSECA  [phone]; OK to leave message on voicemail  ---------------------------------------------------------------------------  --------------  SCRIPT ANSWERS  COVID Screen: Stewart Cohen

## 2023-03-23 ENCOUNTER — TELEPHONE (OUTPATIENT)
Dept: FAMILY MEDICINE CLINIC | Age: 58
End: 2023-03-23

## 2023-03-24 ENCOUNTER — OFFICE VISIT (OUTPATIENT)
Dept: FAMILY MEDICINE CLINIC | Age: 58
End: 2023-03-24
Payer: COMMERCIAL

## 2023-03-24 VITALS
BODY MASS INDEX: 26.04 KG/M2 | DIASTOLIC BLOOD PRESSURE: 74 MMHG | TEMPERATURE: 97.9 F | SYSTOLIC BLOOD PRESSURE: 124 MMHG | HEART RATE: 62 BPM | OXYGEN SATURATION: 99 % | WEIGHT: 147 LBS

## 2023-03-24 DIAGNOSIS — L72.3 SEBACEOUS CYST: Primary | ICD-10-CM

## 2023-03-24 PROCEDURE — 99213 OFFICE O/P EST LOW 20 MIN: CPT | Performed by: FAMILY MEDICINE

## 2023-03-24 NOTE — PROGRESS NOTES
Subjective:      Patient ID: Melvi Vincent is a 61 y.o. female. CC: Patient presents for acute medical problem-right neck skin lesion. Medical assistant notes reviewed. HPI Patient presents with lump on the right side of her neck for quite some time. She states its more noticeable now that she has lost weight. Patient states the skin lesion has grown over the last year but she is not sure if this related to her weight loss. Review of Systems      Allergies   Allergen Reactions    Penicillins Rash    Sulfa Antibiotics Rash        Objective:   Physical Exam  Vitals and nursing note reviewed. Constitutional:       General: She is not in acute distress. Appearance: She is well-developed. Skin:     General: Skin is warm. Findings: Lesion present. Comments: Right neck with a 1 cm subcutaneous skin lesion consistent with probable sebaceous cyst.   Neurological:      Mental Status: She is alert. Psychiatric:         Behavior: Behavior is cooperative. Assessment:      Peyton Hawley was seen today for mass. Diagnoses and all orders for this visit:    Sebaceous cyst          Plan:      Information handout provided to the patient.   Recommend surgical excision and is to be scheduled

## 2023-04-23 SDOH — ECONOMIC STABILITY: FOOD INSECURITY: WITHIN THE PAST 12 MONTHS, YOU WORRIED THAT YOUR FOOD WOULD RUN OUT BEFORE YOU GOT MONEY TO BUY MORE.: NEVER TRUE

## 2023-04-23 SDOH — ECONOMIC STABILITY: HOUSING INSECURITY
IN THE LAST 12 MONTHS, WAS THERE A TIME WHEN YOU DID NOT HAVE A STEADY PLACE TO SLEEP OR SLEPT IN A SHELTER (INCLUDING NOW)?: NO

## 2023-04-23 SDOH — ECONOMIC STABILITY: TRANSPORTATION INSECURITY
IN THE PAST 12 MONTHS, HAS LACK OF TRANSPORTATION KEPT YOU FROM MEETINGS, WORK, OR FROM GETTING THINGS NEEDED FOR DAILY LIVING?: NO

## 2023-04-23 SDOH — ECONOMIC STABILITY: FOOD INSECURITY: WITHIN THE PAST 12 MONTHS, THE FOOD YOU BOUGHT JUST DIDN'T LAST AND YOU DIDN'T HAVE MONEY TO GET MORE.: NEVER TRUE

## 2023-04-23 SDOH — ECONOMIC STABILITY: INCOME INSECURITY: HOW HARD IS IT FOR YOU TO PAY FOR THE VERY BASICS LIKE FOOD, HOUSING, MEDICAL CARE, AND HEATING?: NOT HARD AT ALL

## 2023-04-24 ENCOUNTER — OFFICE VISIT (OUTPATIENT)
Dept: FAMILY MEDICINE CLINIC | Age: 58
End: 2023-04-24

## 2023-04-24 VITALS
HEART RATE: 59 BPM | DIASTOLIC BLOOD PRESSURE: 88 MMHG | TEMPERATURE: 97.3 F | SYSTOLIC BLOOD PRESSURE: 134 MMHG | OXYGEN SATURATION: 99 %

## 2023-04-24 DIAGNOSIS — L72.3 SEBACEOUS CYST: Primary | ICD-10-CM

## 2023-04-24 ASSESSMENT — PATIENT HEALTH QUESTIONNAIRE - PHQ9
SUM OF ALL RESPONSES TO PHQ QUESTIONS 1-9: 0
2. FEELING DOWN, DEPRESSED OR HOPELESS: 0
SUM OF ALL RESPONSES TO PHQ9 QUESTIONS 1 & 2: 0
SUM OF ALL RESPONSES TO PHQ QUESTIONS 1-9: 0
SUM OF ALL RESPONSES TO PHQ QUESTIONS 1-9: 0
1. LITTLE INTEREST OR PLEASURE IN DOING THINGS: 0
SUM OF ALL RESPONSES TO PHQ QUESTIONS 1-9: 0

## 2023-04-24 NOTE — PROGRESS NOTES
Subjective:      Patient ID: Tootie Moreno is a 60 y.o. female. HPI Patient presents for removal of the cyst on her neck. Review of Systems      Allergies   Allergen Reactions    Penicillins Rash    Sulfa Antibiotics Rash        Objective:   Physical Exam    Assessment:      Lennox Chin was seen today for cyst.    Diagnoses and all orders for this visit:    Sebaceous cyst  -     OR EXC B9 LESION MRGN XCP SK TG S/N/H/F/G 0.5 CM/<            Plan:      Excisional Biopsy Procedure Note    Pre-operative Diagnosis: Sebaceous cyst    Post-operative Diagnosis: same    Locations:right  neck    Anesthesia: Lidocaine 1% with epinephrine    Procedure Details     Patient informed of the risks (including bleeding and infection) and benefits of the   procedure and Verbal informed consent obtained. The lesion and surrounding area was given a sterile prep using chloraprep and draped in the usual sterile fashion. The skin was then stretched perpendicular to the skin tension lines and the lesion removed using the 6 mm punch and sharp dissection subcutaneously removing the cyst entirely. Wound was sutured using 5-0 suture. Sterile pressure dressing applied. The specimen was sent for pathologic examination. The patient tolerated the procedure well. EBL: minimal    Condition:  Stable    Complications:  none. Plan:  1. Instructed to keep the wound dry and covered for 24-48h and clean thereafter. No showering for 24 hours. 2. Warning signs of infection were reviewed. 3. Recommended that the patient use OTC analgesics as needed for pain.    4. RTC 7-10 days for suture removal

## 2023-05-02 ENCOUNTER — OFFICE VISIT (OUTPATIENT)
Dept: FAMILY MEDICINE CLINIC | Age: 58
End: 2023-05-02

## 2023-05-02 VITALS
HEART RATE: 73 BPM | TEMPERATURE: 98.2 F | SYSTOLIC BLOOD PRESSURE: 102 MMHG | OXYGEN SATURATION: 97 % | DIASTOLIC BLOOD PRESSURE: 60 MMHG

## 2023-05-02 DIAGNOSIS — L72.3 SEBACEOUS CYST: Primary | ICD-10-CM

## 2023-05-02 PROCEDURE — 99024 POSTOP FOLLOW-UP VISIT: CPT | Performed by: FAMILY MEDICINE

## 2023-05-02 NOTE — PROGRESS NOTES
Pt presents for suture removal.    Physical Exam   There were no vitals taken for this visit. Constitutional: She appears well-developed and well-nourished. No distress. Biopsy results discussed with pt NA  the wound appears well healed  Sutures removed without difficulty    Assessment: suture removal    Plan  Wound care instructions provided  Patient was instructed to be alert for any signs of cutaneous infection.

## 2023-08-22 LAB
CHOLEST SERPL-MCNC: 162 MG/DL (ref 0–199)
GLUCOSE SERPL-MCNC: 91 MG/DL (ref 70–99)
HDLC SERPL-MCNC: 50 MG/DL (ref 40–60)
LDLC SERPL CALC-MCNC: 101 MG/DL
TRIGL SERPL-MCNC: 57 MG/DL (ref 0–150)

## 2023-10-05 LAB
HPV HR 12 DNA SPEC QL NAA+PROBE: NOT DETECTED
HPV16 DNA SPEC QL NAA+PROBE: NOT DETECTED
HPV16+18+H RISK 12 DNA SPEC-IMP: NORMAL
HPV18 DNA SPEC QL NAA+PROBE: NOT DETECTED

## 2023-12-01 ENCOUNTER — HOSPITAL ENCOUNTER (OUTPATIENT)
Dept: WOMENS IMAGING | Age: 58
Discharge: HOME | End: 2023-12-01
Payer: COMMERCIAL

## 2023-12-01 DIAGNOSIS — Z12.31 BREAST CANCER SCREENING BY MAMMOGRAM: ICD-10-CM

## 2023-12-01 PROCEDURE — 77067 SCR MAMMO BI INCL CAD: CPT

## 2024-04-30 ENCOUNTER — OFFICE VISIT (OUTPATIENT)
Dept: FAMILY MEDICINE CLINIC | Age: 59
End: 2024-04-30
Payer: COMMERCIAL

## 2024-04-30 VITALS
SYSTOLIC BLOOD PRESSURE: 128 MMHG | OXYGEN SATURATION: 95 % | DIASTOLIC BLOOD PRESSURE: 66 MMHG | HEIGHT: 63 IN | WEIGHT: 198 LBS | TEMPERATURE: 97.4 F | BODY MASS INDEX: 35.08 KG/M2 | HEART RATE: 59 BPM

## 2024-04-30 DIAGNOSIS — R06.09 DOE (DYSPNEA ON EXERTION): ICD-10-CM

## 2024-04-30 DIAGNOSIS — Z13.1 SCREENING FOR DIABETES MELLITUS: ICD-10-CM

## 2024-04-30 DIAGNOSIS — R06.2 WHEEZING: ICD-10-CM

## 2024-04-30 DIAGNOSIS — Z13.220 SCREENING FOR CHOLESTEROL LEVEL: ICD-10-CM

## 2024-04-30 DIAGNOSIS — R63.5 EXCESSIVE WEIGHT GAIN: Primary | ICD-10-CM

## 2024-04-30 DIAGNOSIS — Z13.29 SCREENING FOR THYROID DISORDER: ICD-10-CM

## 2024-04-30 PROCEDURE — 99214 OFFICE O/P EST MOD 30 MIN: CPT | Performed by: NURSE PRACTITIONER

## 2024-04-30 RX ORDER — ALBUTEROL SULFATE 90 UG/1
2 AEROSOL, METERED RESPIRATORY (INHALATION) 4 TIMES DAILY PRN
Qty: 54 G | Refills: 1 | Status: SHIPPED | OUTPATIENT
Start: 2024-04-30

## 2024-04-30 RX ORDER — MONTELUKAST SODIUM 10 MG/1
10 TABLET ORAL DAILY
Qty: 30 TABLET | Refills: 3 | Status: SHIPPED | OUTPATIENT
Start: 2024-04-30

## 2024-04-30 SDOH — ECONOMIC STABILITY: FOOD INSECURITY: WITHIN THE PAST 12 MONTHS, THE FOOD YOU BOUGHT JUST DIDN'T LAST AND YOU DIDN'T HAVE MONEY TO GET MORE.: NEVER TRUE

## 2024-04-30 SDOH — ECONOMIC STABILITY: INCOME INSECURITY: HOW HARD IS IT FOR YOU TO PAY FOR THE VERY BASICS LIKE FOOD, HOUSING, MEDICAL CARE, AND HEATING?: NOT HARD AT ALL

## 2024-04-30 SDOH — ECONOMIC STABILITY: FOOD INSECURITY: WITHIN THE PAST 12 MONTHS, YOU WORRIED THAT YOUR FOOD WOULD RUN OUT BEFORE YOU GOT MONEY TO BUY MORE.: NEVER TRUE

## 2024-04-30 ASSESSMENT — PATIENT HEALTH QUESTIONNAIRE - PHQ9
SUM OF ALL RESPONSES TO PHQ QUESTIONS 1-9: 0
SUM OF ALL RESPONSES TO PHQ QUESTIONS 1-9: 0
2. FEELING DOWN, DEPRESSED OR HOPELESS: NOT AT ALL
SUM OF ALL RESPONSES TO PHQ QUESTIONS 1-9: 0
SUM OF ALL RESPONSES TO PHQ9 QUESTIONS 1 & 2: 0
SUM OF ALL RESPONSES TO PHQ QUESTIONS 1-9: 0
1. LITTLE INTEREST OR PLEASURE IN DOING THINGS: NOT AT ALL

## 2024-04-30 NOTE — PROGRESS NOTES
Priscilla Prasad  : 1965  Encounter date: 2024    This rosy 59 y.o. female who presents with  Chief Complaint   Patient presents with    Fatigue     Weight gain, HA, SOB x2mos       History of present illness:    HPI Pt is 59 year old female with concerns for excessive weight gain, 51 lbs in one year. Reports dyspnea with exertion, 95% O2.  Pt has had significant cardiac work up in past including stress test -    Summary The quality of the study is good. There is subdiaphragmatic attenuation. Left ventricular cavity size is normal. The right ventricle is normal in size. SPECT images demonstrate homogeneous tracer distribution throughout the myocardium. Gated SPECT imaging reveals normal myocardial thickening and wall motion. The left ventricular ejection fraction is 67%.   No ischemic changes.     Chest xray- NL    Pt reports brother passing last year and sister diagnosed with breast cancer, has gotten away from low CHO diet and exercise.  Reports will be starting back.  Pt denies fluid retention.    Due for labs, previous TSH NL .  Pt reports wheezing with EDWARDS, denies cough, has not been evaluated by pulmonology, possible history asthma, taking OTC antihistamines.    Current Outpatient Medications on File Prior to Visit   Medication Sig Dispense Refill    Specialty Vitamins Products (VITAMINS FOR HAIR PO) Take by mouth      citalopram (CELEXA) 10 MG tablet Take 3 tablets by mouth daily Take three tabs PO qd      oxybutynin (DITROPAN) 5 MG tablet Take 1 tablet by mouth nightly       No current facility-administered medications on file prior to visit.      Allergies   Allergen Reactions    Penicillins Rash    Sulfa Antibiotics Rash     Past Medical History:   Diagnosis Date    Anxiety     Endometriosis     laporoscopy: endometriosis 1987    Migraine, unspecified, without mention of intractable migraine without mention of status migrainosus       Past Surgical History:   Procedure Laterality

## 2024-05-01 DIAGNOSIS — Z13.220 SCREENING FOR CHOLESTEROL LEVEL: ICD-10-CM

## 2024-05-01 DIAGNOSIS — Z13.29 SCREENING FOR THYROID DISORDER: ICD-10-CM

## 2024-05-01 DIAGNOSIS — R63.5 EXCESSIVE WEIGHT GAIN: ICD-10-CM

## 2024-05-01 DIAGNOSIS — Z13.1 SCREENING FOR DIABETES MELLITUS: ICD-10-CM

## 2024-05-01 DIAGNOSIS — R06.09 DOE (DYSPNEA ON EXERTION): ICD-10-CM

## 2024-05-01 LAB
ALBUMIN SERPL-MCNC: 4.4 G/DL (ref 3.4–5)
ALBUMIN/GLOB SERPL: 1.8 {RATIO} (ref 1.1–2.2)
ALP SERPL-CCNC: 78 U/L (ref 40–129)
ALT SERPL-CCNC: 19 U/L (ref 10–40)
ANION GAP SERPL CALCULATED.3IONS-SCNC: 12 MMOL/L (ref 3–16)
AST SERPL-CCNC: 21 U/L (ref 15–37)
BASOPHILS # BLD: 0 K/UL (ref 0–0.2)
BASOPHILS NFR BLD: 0.8 %
BILIRUB SERPL-MCNC: 0.3 MG/DL (ref 0–1)
BUN SERPL-MCNC: 25 MG/DL (ref 7–20)
CALCIUM SERPL-MCNC: 9.6 MG/DL (ref 8.3–10.6)
CHLORIDE SERPL-SCNC: 101 MMOL/L (ref 99–110)
CHOLEST SERPL-MCNC: 208 MG/DL (ref 0–199)
CO2 SERPL-SCNC: 27 MMOL/L (ref 21–32)
CREAT SERPL-MCNC: 0.8 MG/DL (ref 0.6–1.2)
CREAT UR-MCNC: 140.5 MG/DL (ref 28–259)
DEPRECATED RDW RBC AUTO: 14 % (ref 12.4–15.4)
EOSINOPHIL # BLD: 0.1 K/UL (ref 0–0.6)
EOSINOPHIL NFR BLD: 3.1 %
GFR SERPLBLD CREATININE-BSD FMLA CKD-EPI: 84 ML/MIN/{1.73_M2}
GLUCOSE SERPL-MCNC: 94 MG/DL (ref 70–99)
HCT VFR BLD AUTO: 37.8 % (ref 36–48)
HDLC SERPL-MCNC: 42 MG/DL (ref 40–60)
HGB BLD-MCNC: 12.6 G/DL (ref 12–16)
LDL CHOLESTEROL: 146 MG/DL
LYMPHOCYTES # BLD: 1.4 K/UL (ref 1–5.1)
LYMPHOCYTES NFR BLD: 29.7 %
MCH RBC QN AUTO: 28.6 PG (ref 26–34)
MCHC RBC AUTO-ENTMCNC: 33.4 G/DL (ref 31–36)
MCV RBC AUTO: 85.6 FL (ref 80–100)
MICROALBUMIN UR DL<=1MG/L-MCNC: <1.2 MG/DL
MICROALBUMIN/CREAT UR: NORMAL MG/G (ref 0–30)
MONOCYTES # BLD: 0.4 K/UL (ref 0–1.3)
MONOCYTES NFR BLD: 9.3 %
NEUTROPHILS # BLD: 2.7 K/UL (ref 1.7–7.7)
NEUTROPHILS NFR BLD: 57.1 %
NT-PROBNP SERPL-MCNC: 46 PG/ML (ref 0–124)
PLATELET # BLD AUTO: 204 K/UL (ref 135–450)
PMV BLD AUTO: 9.3 FL (ref 5–10.5)
POTASSIUM SERPL-SCNC: 4.3 MMOL/L (ref 3.5–5.1)
PROT SERPL-MCNC: 6.9 G/DL (ref 6.4–8.2)
RBC # BLD AUTO: 4.42 M/UL (ref 4–5.2)
SODIUM SERPL-SCNC: 140 MMOL/L (ref 136–145)
TRIGL SERPL-MCNC: 98 MG/DL (ref 0–150)
TSH SERPL DL<=0.005 MIU/L-ACNC: 1.92 UIU/ML (ref 0.27–4.2)
VLDLC SERPL CALC-MCNC: 20 MG/DL
WBC # BLD AUTO: 4.8 K/UL (ref 4–11)

## 2024-05-02 LAB
EST. AVERAGE GLUCOSE BLD GHB EST-MCNC: 114 MG/DL
HBA1C MFR BLD: 5.6 %

## 2024-05-06 ENCOUNTER — OFFICE VISIT (OUTPATIENT)
Dept: PULMONOLOGY | Age: 59
End: 2024-05-06
Payer: COMMERCIAL

## 2024-05-06 ENCOUNTER — HOSPITAL ENCOUNTER (OUTPATIENT)
Age: 59
Discharge: HOME | End: 2024-05-06
Payer: COMMERCIAL

## 2024-05-06 ENCOUNTER — HOSPITAL ENCOUNTER (OUTPATIENT)
Dept: GENERAL RADIOLOGY | Age: 59
Discharge: HOME | End: 2024-05-06
Payer: COMMERCIAL

## 2024-05-06 VITALS
BODY MASS INDEX: 34.94 KG/M2 | DIASTOLIC BLOOD PRESSURE: 82 MMHG | HEART RATE: 63 BPM | WEIGHT: 197.2 LBS | HEIGHT: 63 IN | SYSTOLIC BLOOD PRESSURE: 126 MMHG | OXYGEN SATURATION: 98 %

## 2024-05-06 DIAGNOSIS — J45.40 MODERATE PERSISTENT ASTHMA WITHOUT COMPLICATION: Primary | ICD-10-CM

## 2024-05-06 DIAGNOSIS — Z86.16 HISTORY OF COVID-19: ICD-10-CM

## 2024-05-06 DIAGNOSIS — J45.40 MODERATE PERSISTENT ASTHMA WITHOUT COMPLICATION: ICD-10-CM

## 2024-05-06 PROCEDURE — 71046 X-RAY EXAM CHEST 2 VIEWS: CPT

## 2024-05-06 PROCEDURE — 99204 OFFICE O/P NEW MOD 45 MIN: CPT | Performed by: INTERNAL MEDICINE

## 2024-05-06 RX ORDER — FLUTICASONE FUROATE AND VILANTEROL 200; 25 UG/1; UG/1
1 POWDER RESPIRATORY (INHALATION) DAILY
Qty: 1 EACH | Refills: 5 | Status: SHIPPED | OUTPATIENT
Start: 2024-05-06

## 2024-05-06 ASSESSMENT — ENCOUNTER SYMPTOMS
ABDOMINAL PAIN: 0
SINUS PRESSURE: 0
DIARRHEA: 0
NAUSEA: 0
CONSTIPATION: 0

## 2024-05-06 NOTE — PROGRESS NOTES
[facility]  Consult Note  Jayson Bettencourt MD       Priscilla Prasad   YOB: 1965    Date of Visit:  5/6/2024    Assessment/Plan:  1. Moderate persistent asthma without complication  2. History of COVID-19    She does have a history of post-COVID cough.  However, something seems to have changed in the last couple of months.  It is not obvious what triggered this change.  However, it does sound like she could have asthma.    Have her get PFT with methacholine  Also get chest x-ray  Start her on Breo 200  Continue Singulair  Albuterol as needed.  Discussed using it in advance of activities that she knows may be difficult for her breathing.    Follow-up in 1 month      Chief Complaint   Patient presents with    Shortness of Breath    Cough    Wheezing       HPI  The patient is referred for evaluation of shortness of breath and cough.  She is also had some associated wheezing.  This has been going on for the last several months.  She actually had COVID a couple of years ago.  She has had a lingering intermittent cough since that time.  However, it seems like something changed a couple months ago when she had worsening shortness of breath and cough.  Cough and dyspnea seem to be triggered by exertion.  She is sensitive to odors such as cleaning odors.  She is a lifetime non-smoker with minimal secondary smoke exposure.  She states that she might have been told at one point she had asthma as a child but really has never treated herself for asthma as an adult.  She was given Singulair and albuterol by her PCP.  She found this to be helpful.  She notes the cough is less intense on these medicines.    She does not have any pets at home.  No down comforter or feather pillow.  She has some GERD but this sounds minimal at the moment.    Review of Systems  Review of Systems   Constitutional:  Negative for fatigue and fever.   HENT:  Negative for congestion and sinus

## 2024-05-14 DIAGNOSIS — J45.40 MODERATE PERSISTENT ASTHMA WITHOUT COMPLICATION: Primary | ICD-10-CM

## 2024-05-22 ENCOUNTER — HOSPITAL ENCOUNTER (OUTPATIENT)
Dept: PULMONOLOGY | Age: 59
Discharge: HOME | End: 2024-05-22
Attending: INTERNAL MEDICINE
Payer: COMMERCIAL

## 2024-05-22 DIAGNOSIS — J45.40 MODERATE PERSISTENT ASTHMA WITHOUT COMPLICATION: ICD-10-CM

## 2024-05-22 PROCEDURE — 6360000002 HC RX W HCPCS: Performed by: INTERNAL MEDICINE

## 2024-05-22 PROCEDURE — 94760 N-INVAS EAR/PLS OXIMETRY 1: CPT

## 2024-05-22 PROCEDURE — 6370000000 HC RX 637 (ALT 250 FOR IP): Performed by: INTERNAL MEDICINE

## 2024-05-22 PROCEDURE — 94726 PLETHYSMOGRAPHY LUNG VOLUMES: CPT

## 2024-05-22 PROCEDURE — 94729 DIFFUSING CAPACITY: CPT

## 2024-05-22 PROCEDURE — 94070 EVALUATION OF WHEEZING: CPT

## 2024-05-22 RX ORDER — ALBUTEROL SULFATE 2.5 MG/3ML
2.5 SOLUTION RESPIRATORY (INHALATION) ONCE
Status: COMPLETED | OUTPATIENT
Start: 2024-05-22 | End: 2024-05-22

## 2024-05-22 RX ORDER — SODIUM CHLORIDE FOR INHALATION 0.9 %
3 VIAL, NEBULIZER (ML) INHALATION PRN
Status: DISCONTINUED | OUTPATIENT
Start: 2024-05-22 | End: 2024-05-23 | Stop reason: HOSPADM

## 2024-05-22 RX ADMIN — METHACHOLINE CHLORIDE 0.06 MG: 100 POWDER, FOR SOLUTION RESPIRATORY (INHALATION) at 10:48

## 2024-05-22 RX ADMIN — METHACHOLINE CHLORIDE 16 MG: 100 POWDER, FOR SOLUTION RESPIRATORY (INHALATION) at 11:17

## 2024-05-22 RX ADMIN — METHACHOLINE CHLORIDE 1 MG: 100 POWDER, FOR SOLUTION RESPIRATORY (INHALATION) at 11:06

## 2024-05-22 RX ADMIN — METHACHOLINE CHLORIDE 0.25 MG: 100 POWDER, FOR SOLUTION RESPIRATORY (INHALATION) at 10:58

## 2024-05-22 RX ADMIN — METHACHOLINE CHLORIDE 4 MG: 100 POWDER, FOR SOLUTION RESPIRATORY (INHALATION) at 11:10

## 2024-05-22 RX ADMIN — Medication 3 ML: at 10:42

## 2024-05-22 RX ADMIN — ALBUTEROL SULFATE 2.5 MG: 2.5 SOLUTION RESPIRATORY (INHALATION) at 11:19

## 2024-05-23 NOTE — PROCEDURES
Pulmonary Function Testing      Patient name:  Priscilla Prasad      Unit #:   [id number]   Date of test:  5/22/2024   Date of interpretation:   5/23/2024    Ms. Priscilla Prasad is a 59 y.o. year-old non smoker. The spirometry data were acceptable and reproducible.     Spirometry:  Flow volume loops were normal. The FEV-1/FVC ratio was normal. The pre-bronchodilator FEV-1 was 2.44 liters (99% of predicted), which was normal. The FVC was 2.97 liters (93% of predicted), which was normal. Response to inhaled bronchodilators (albuterol) was not significant.    Lung volumes:  Lung volumes were tested by plethysmography. The total lung capacity was 5.61 liters (120% of predicted), which was increased. The residual volume was 1.83 liters (104% of predicted), which was normal. The ratio of residual volume to total lung capacity (RV/TLC) was 33, which was normal.     Diffusion capacity was found to be 87% predicted which is Normal.      Methacholine challenge test: No significant decrease in FEV1 noted at all doses of methacholine challenge.    Interpretation:  Signs of hyperinflation only with a negative methacholine challenge test.    Comments:

## 2024-06-18 ENCOUNTER — OFFICE VISIT (OUTPATIENT)
Dept: PULMONOLOGY | Age: 59
End: 2024-06-18
Payer: COMMERCIAL

## 2024-06-18 VITALS
DIASTOLIC BLOOD PRESSURE: 80 MMHG | BODY MASS INDEX: 36.04 KG/M2 | HEIGHT: 63 IN | HEART RATE: 66 BPM | OXYGEN SATURATION: 97 % | SYSTOLIC BLOOD PRESSURE: 124 MMHG | WEIGHT: 203.4 LBS

## 2024-06-18 DIAGNOSIS — R05.3 CHRONIC COUGH: Primary | ICD-10-CM

## 2024-06-18 DIAGNOSIS — R06.02 SOB (SHORTNESS OF BREATH): ICD-10-CM

## 2024-06-18 PROCEDURE — 99214 OFFICE O/P EST MOD 30 MIN: CPT | Performed by: INTERNAL MEDICINE

## 2024-06-18 RX ORDER — FLUTICASONE FUROATE AND VILANTEROL 100; 25 UG/1; UG/1
1 POWDER RESPIRATORY (INHALATION) DAILY
Qty: 1 EACH | Refills: 1 | Status: SHIPPED | OUTPATIENT
Start: 2024-06-18

## 2024-06-18 ASSESSMENT — ENCOUNTER SYMPTOMS
NAUSEA: 0
ABDOMINAL PAIN: 0
SINUS PRESSURE: 0
CONSTIPATION: 0
DIARRHEA: 0

## 2024-06-18 NOTE — PROGRESS NOTES
[facility]  Consult Note  Jayson Bettencourt MD       Priscilla Prasad   YOB: 1965    Date of Visit:  6/18/2024    Assessment/Plan:  1. ChronicCough  2. SOB    Cough is better on breo  Still feels SOB with exertion    PFT is negative for asthma  CXR is clear  No obvious Pulmonary disease  She may benefit from a Cardiac work up    Taper off Breo        Chief Complaint   Patient presents with    1 Month Follow-Up    Asthma       HPI  The patient is referred for evaluation of shortness of breath and cough.  She is also had some associated wheezing.  This has been going on for the last several months.  She actually had COVID a couple of years ago.  She has had a lingering intermittent cough since that time.  However, it seems like something changed a couple months ago when she had worsening shortness of breath and cough.  Cough and dyspnea seem to be triggered by exertion.  She is sensitive to odors such as cleaning odors.  She is a lifetime non-smoker with minimal secondary smoke exposure.  She states that she might have been told at one point she had asthma as a child but really has never treated herself for asthma as an adult.  She was given Singulair and albuterol by her PCP.  She found this to be helpful.  She notes the cough is less intense on these medicines.    She does not have any pets at home.  No down comforter or feather pillow.  She has some GERD but this sounds minimal at the moment.    Review of Systems  Review of Systems   Constitutional:  Negative for fatigue and fever.   HENT:  Negative for congestion and sinus pressure.    Eyes:  Negative for visual disturbance.   Cardiovascular:  Negative for chest pain and palpitations.   Gastrointestinal:  Negative for abdominal pain, constipation, diarrhea and nausea.   Genitourinary:  Negative for difficulty urinating.   Musculoskeletal:  Negative for arthralgias.   Skin:  Negative for rash.

## 2024-08-01 NOTE — PROGRESS NOTES
Randi Grey  [id number]  Anabella 3, 2020    Chief Complaint   Patient presents with   Madeleine Contreras Post-Op Check     Right shoulder arthroscopy, subacromial decompression and natalie and rotator cuff repair; DOS 3/13/19        History: The patient is a 50-year-old female admitted for follow-up evaluation regarding her right shoulder arthroscopy with rotator cuff repair completed on 3/13/19. She reports no pain in the right shoulder. 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 She finished her last physical therapy session Thursday. The patient's  past medical history, medications, allergies,  family history, social history, and review of systems have been reviewed, and dated and are recorded in the chart. Resp 16   Ht 5' 3.75\" (1.619 m)   Wt 200 lb (90.7 kg)   BMI 34.60 kg/m²     Physical:  Ms. Randi Grey appears well, she is in no apparent distress, she demonstrates appropriate mood & affect. She is alert and oriented to person, place and time. She has moderate swelling. She is neurovascularly intact distally. Sensation is intact in the axillary nerve distribution. right shoulder range of motion: 165 degrees abduction, 170 degrees forward flexion, 35 degrees external rotation. She has minimal pain with light range of motion of the shoulder. The incisions are clean, dry, intact and nontender and without erythema. Strength in the shoulder was 4+/5. Impression: status post right shoulder arthroscopy, subacromial decompression and natalie and rotator cuff repair    Plan: The patient will continue to work aggressively on range of motion and strengthening. She will gradually resume regular activities. The patient will follow up with me on a p.r.n. basis. no

## 2024-09-24 LAB
CHOLEST SERPL-MCNC: 205 MG/DL (ref 0–199)
GLUCOSE SERPL-MCNC: 92 MG/DL (ref 70–99)
HDLC SERPL-MCNC: 48 MG/DL (ref 40–60)
LDLC SERPL CALC-MCNC: 132 MG/DL
TRIGL SERPL-MCNC: 124 MG/DL (ref 0–150)

## 2024-12-14 ENCOUNTER — HOSPITAL ENCOUNTER (OUTPATIENT)
Dept: WOMENS IMAGING | Age: 59
Discharge: HOME | End: 2024-12-14
Payer: COMMERCIAL

## 2024-12-14 DIAGNOSIS — Z12.31 ENCOUNTER FOR SCREENING MAMMOGRAM FOR BREAST CANCER: ICD-10-CM

## 2024-12-14 PROCEDURE — 77063 BREAST TOMOSYNTHESIS BI: CPT

## (undated) DEVICE — [TOMCAT CUTTER, ARTHROSCOPIC SHAVER BLADE,  DO NOT RESTERILIZE,  DO NOT USE IF PACKAGE IS DAMAGED,  KEEP DRY,  KEEP AWAY FROM SUNLIGHT]: Brand: FORMULA

## (undated) DEVICE — STERILE POLYISOPRENE POWDER-FREE SURGICAL GLOVES: Brand: PROTEXIS

## (undated) DEVICE — 3M™ COBAN™ NL STERILE NON-LATEX SELF-ADHERENT WRAP, 2086S, 6 IN X 5 YD (15 CM X 4,5 M), 12 ROLLS/CASE: Brand: 3M™ COBAN™

## (undated) DEVICE — KIT OR ROOM TURNOVER W/STRAP

## (undated) DEVICE — CHLORAPREP 26ML ORANGE

## (undated) DEVICE — 3M™ TEGADERM™ TRANSPARENT FILM DRESSING FRAME STYLE, 1626W, 4 IN X 4-3/4 IN (10 CM X 12 CM), 50/CT 4CT/CASE: Brand: 3M™ TEGADERM™

## (undated) DEVICE — DRAPE,U/SHT,SPLIT,FILM,60X84,STERILE: Brand: MEDLINE

## (undated) DEVICE — UNIVERSAL BLOCK TRAY: Brand: AVANOS*

## (undated) DEVICE — INTENDED FOR TISSUE SEPARATION, AND OTHER PROCEDURES THAT REQUIRE A SHARP SURGICAL BLADE TO PUNCTURE OR CUT.: Brand: BARD-PARKER ® STAINLESS STEEL BLADES

## (undated) DEVICE — SOLUTION IV IRRIG POUR BRL 0.9% SODIUM CHL 2F7124

## (undated) DEVICE — 3M™ COBAN™ NL STERILE NON-LATEX SELF-ADHERENT WRAP, 2084S, 4 IN X 5 YD (10 CM X 4,5 M), 18 ROLLS/CASE: Brand: 3M™ COBAN™

## (undated) DEVICE — SUTURE N ABSRB BRAIDED 2-0 OS-4 30 IN GRN ETHBND EXCEL X519H

## (undated) DEVICE — DRAPE,SHOULDER,BEACH CHAIR,STERILE: Brand: MEDLINE

## (undated) DEVICE — GLOVE,SURG,SENSICARE SLT,LF,PF,8: Brand: MEDLINE

## (undated) DEVICE — DRESSING,GAUZE,XEROFORM,CURAD,1"X8",ST: Brand: CURAD

## (undated) DEVICE — 4-PORT MANIFOLD: Brand: NEPTUNE 2

## (undated) DEVICE — NEEDLE SPNL L3.5IN PNK HUB S STL REG WALL FIT STYL W/ QNCKE

## (undated) DEVICE — SPONGE GZ W4XL4IN COT 12 PLY TYP VII WVN C FLD DSGN

## (undated) DEVICE — INTENDED TO SUPPORT AND MAINTAIN THE POSITION OF AN ANESTHETIZED PATIENT DURING SURGERY: Brand: ERIN BEACH CHAIR FACE MASK

## (undated) DEVICE — HYPODERMIC SAFETY NEEDLE: Brand: MAGELLAN

## (undated) DEVICE — MAJOR SET UP PK

## (undated) DEVICE — STRIP,CLOSURE,WOUND,MEDI-STRIP,1/2X4: Brand: MEDLINE

## (undated) DEVICE — GLOVE,SURG,SENSICARE SLT,LF,PF,8.5: Brand: MEDLINE

## (undated) DEVICE — SUTURE VCRL SZ 1 L27IN ABSRB UD CT-1 L36MM 1/2 CIR J261H

## (undated) DEVICE — SUTURE MCRYL SZ 4-0 L18IN ABSRB UD L19MM PS-2 3/8 CIR PRIM Y496G

## (undated) DEVICE — 3M™ STERI-DRAPE™ INSTRUMENT POUCH 1018: Brand: STERI-DRAPE™

## (undated) DEVICE — PORT VLV 2 W NDL FREE SMRTSITE

## (undated) DEVICE — SUTURE ABSORBABLE BRAIDED 2-0 CT-1 27 IN UD VICRYL J259H

## (undated) DEVICE — COVER LT HNDL BLU PLAS

## (undated) DEVICE — STANDARD HYPODERMIC NEEDLE,POLYPROPYLENE HUB: Brand: MONOJECT

## (undated) DEVICE — Device

## (undated) DEVICE — DRAPE,REIN 53X77,STERILE: Brand: MEDLINE

## (undated) DEVICE — DISPOSABLE OR TOWEL: Brand: CARDINAL HEALTH

## (undated) DEVICE — SUTURE NONABSORBABLE MONOFILAMENT 4-0 FS-2 18 IN ETHILON 662H

## (undated) DEVICE — Device: Brand: JELCO

## (undated) DEVICE — TUBING, SUCTION, 1/4" X 12', STRAIGHT: Brand: MEDLINE

## (undated) DEVICE — ELECTRODE PT RET AD L9FT HI MOIST COND ADH HYDRGEL CORDED

## (undated) DEVICE — SYRINGE MED 3ML CLR PLAS STD N CTRL LUERLOCK TIP DISP

## (undated) DEVICE — 3M™ STERI-STRIP™ COMPOUND BENZOIN TINCTURE 40 BAGS/CARTON 4 CARTONS/CASE C1544: Brand: 3M™ STERI-STRIP™

## (undated) DEVICE — GOWN SIRUS NONREIN XL W/TWL: Brand: MEDLINE INDUSTRIES, INC.

## (undated) DEVICE — AMBIENT SUPER TURBOVAC 90: Brand: COBLATION

## (undated) DEVICE — 3M™ STERI-DRAPE™ U-DRAPE 1015: Brand: STERI-DRAPE™

## (undated) DEVICE — PEN: MARKING STD 100/CS: Brand: MEDICAL ACTION INDUSTRIES

## (undated) DEVICE — Z INACTIVE USE 2660664 SOLUTION IRRIG 3000ML 0.9% SOD CHL USP UROMATIC PLAS CONT

## (undated) DEVICE — SYRINGE MED 20ML STD CLR PLAS LUERLOCK TIP N CTRL DISP

## (undated) DEVICE — MEDIA CONTRAST RX ISOVUE-300 61% 30ML VIALS

## (undated) DEVICE — THIN OFFSET (9.0 X 0.38 X 25.0MM)

## (undated) DEVICE — SPONGE LAP W18XL18IN WHT COT 4 PLY FLD STRUNG RADPQ DISP ST

## (undated) DEVICE — [ARTHROSCOPY PUMP,  DO NOT USE IF PACKAGE IS DAMAGED,  KEEP DRY,  KEEP AWAY FROM SUNLIGHT,  PROTECT FROM HEAT AND RADIOACTIVE SOURCES.]: Brand: FLOSTEADY

## (undated) DEVICE — NEEDLE EPI L3.5IN OD20GA CLR POLYCARB HUB WNG N DEHP TUOHY